# Patient Record
Sex: FEMALE | Race: WHITE | Employment: OTHER | URBAN - METROPOLITAN AREA
[De-identification: names, ages, dates, MRNs, and addresses within clinical notes are randomized per-mention and may not be internally consistent; named-entity substitution may affect disease eponyms.]

---

## 2017-01-19 PROCEDURE — 36415 COLL VENOUS BLD VENIPUNCTURE: CPT | Performed by: INTERNAL MEDICINE

## 2017-01-19 PROCEDURE — 84432 ASSAY OF THYROGLOBULIN: CPT | Performed by: INTERNAL MEDICINE

## 2017-02-07 ENCOUNTER — LAB ENCOUNTER (OUTPATIENT)
Dept: LAB | Age: 82
End: 2017-02-07
Attending: INTERNAL MEDICINE
Payer: MEDICARE

## 2017-02-07 DIAGNOSIS — K83.1: Primary | ICD-10-CM

## 2017-02-07 LAB
ALBUMIN SERPL-MCNC: 3.5 G/DL (ref 3.5–4.8)
ALP LIVER SERPL-CCNC: 227 U/L (ref 55–142)
ALT SERPL-CCNC: 48 U/L (ref 14–54)
AST SERPL-CCNC: 44 U/L (ref 15–41)
BILIRUB DIRECT SERPL-MCNC: 0.2 MG/DL (ref 0.1–0.5)
BILIRUB SERPL-MCNC: 0.6 MG/DL (ref 0.1–2)
M PROTEIN MFR SERPL ELPH: 6.8 G/DL (ref 6.1–8.3)

## 2017-02-07 PROCEDURE — 80076 HEPATIC FUNCTION PANEL: CPT

## 2017-02-22 PROBLEM — E03.9 HYPOTHYROIDISM, UNSPECIFIED TYPE: Status: ACTIVE | Noted: 2017-02-22

## 2017-04-14 PROBLEM — M81.0 AGE-RELATED OSTEOPOROSIS WITHOUT CURRENT PATHOLOGICAL FRACTURE: Status: ACTIVE | Noted: 2017-04-14

## 2017-06-01 ENCOUNTER — LAB ENCOUNTER (OUTPATIENT)
Dept: LAB | Age: 82
End: 2017-06-01
Attending: INTERNAL MEDICINE
Payer: MEDICARE

## 2017-06-01 DIAGNOSIS — R79.89 ELEVATED LFTS: Primary | ICD-10-CM

## 2017-06-01 PROCEDURE — 80076 HEPATIC FUNCTION PANEL: CPT

## 2017-07-03 ENCOUNTER — LAB ENCOUNTER (OUTPATIENT)
Dept: LAB | Age: 82
End: 2017-07-03
Attending: INTERNAL MEDICINE
Payer: MEDICARE

## 2017-07-03 DIAGNOSIS — R74.8 ELEVATED LIVER ENZYMES: Primary | ICD-10-CM

## 2017-07-03 PROCEDURE — 80076 HEPATIC FUNCTION PANEL: CPT

## 2017-07-04 LAB
ALBUMIN SERPL-MCNC: 3.8 G/DL (ref 3.5–4.8)
ALP LIVER SERPL-CCNC: 89 U/L (ref 55–142)
ALT SERPL-CCNC: 38 U/L (ref 14–54)
AST SERPL-CCNC: 28 U/L (ref 15–41)
BILIRUB DIRECT SERPL-MCNC: 0.1 MG/DL (ref 0.1–0.5)
BILIRUB SERPL-MCNC: 0.6 MG/DL (ref 0.1–2)
M PROTEIN MFR SERPL ELPH: 7 G/DL (ref 6.1–8.3)

## 2017-10-19 PROCEDURE — 82523 COLLAGEN CROSSLINKS: CPT | Performed by: INTERNAL MEDICINE

## 2017-10-25 PROCEDURE — 83883 ASSAY NEPHELOMETRY NOT SPEC: CPT | Performed by: OTHER

## 2017-10-25 PROCEDURE — 86334 IMMUNOFIX E-PHORESIS SERUM: CPT | Performed by: OTHER

## 2017-10-25 PROCEDURE — 82607 VITAMIN B-12: CPT | Performed by: OTHER

## 2017-10-25 PROCEDURE — 84165 PROTEIN E-PHORESIS SERUM: CPT | Performed by: OTHER

## 2017-11-01 PROCEDURE — 86480 TB TEST CELL IMMUN MEASURE: CPT | Performed by: INTERNAL MEDICINE

## 2017-11-01 PROCEDURE — 36415 COLL VENOUS BLD VENIPUNCTURE: CPT | Performed by: INTERNAL MEDICINE

## 2017-11-07 PROCEDURE — 86480 TB TEST CELL IMMUN MEASURE: CPT | Performed by: INTERNAL MEDICINE

## 2017-11-07 PROCEDURE — 36415 COLL VENOUS BLD VENIPUNCTURE: CPT | Performed by: INTERNAL MEDICINE

## 2018-02-02 PROBLEM — F33.41 RECURRENT MAJOR DEPRESSIVE DISORDER, IN PARTIAL REMISSION (HCC): Status: ACTIVE | Noted: 2018-02-02

## 2018-02-02 PROBLEM — I70.0 AORTIC ATHEROSCLEROSIS (HCC): Status: ACTIVE | Noted: 2018-02-02

## 2018-02-26 ENCOUNTER — APPOINTMENT (OUTPATIENT)
Dept: GENERAL RADIOLOGY | Facility: HOSPITAL | Age: 83
DRG: 871 | End: 2018-02-26
Attending: EMERGENCY MEDICINE
Payer: MEDICARE

## 2018-02-26 ENCOUNTER — HOSPITAL ENCOUNTER (INPATIENT)
Facility: HOSPITAL | Age: 83
LOS: 13 days | Discharge: NURSING, REHAB OR LONG-TERM CARE | DRG: 871 | End: 2018-03-12
Attending: EMERGENCY MEDICINE | Admitting: INTERNAL MEDICINE
Payer: MEDICARE

## 2018-02-26 DIAGNOSIS — A41.9 SEPSIS DUE TO URINARY TRACT INFECTION (HCC): Primary | ICD-10-CM

## 2018-02-26 DIAGNOSIS — N39.0 SEPSIS DUE TO URINARY TRACT INFECTION (HCC): Primary | ICD-10-CM

## 2018-02-26 PROBLEM — N28.9 ACUTE RENAL INSUFFICIENCY: Status: ACTIVE | Noted: 2018-02-26

## 2018-02-26 LAB
ANION GAP SERPL CALC-SCNC: 11 MMOL/L (ref 0–18)
BASOPHILS # BLD: 0.1 K/UL (ref 0–0.2)
BASOPHILS NFR BLD: 0 %
BILIRUB UR QL: NEGATIVE
BUN SERPL-MCNC: 33 MG/DL (ref 8–20)
BUN/CREAT SERPL: 15.6 (ref 10–20)
CALCIUM SERPL-MCNC: 8.9 MG/DL (ref 8.5–10.5)
CHLORIDE SERPL-SCNC: 98 MMOL/L (ref 95–110)
CO2 SERPL-SCNC: 24 MMOL/L (ref 22–32)
COLOR UR: YELLOW
CREAT SERPL-MCNC: 2.12 MG/DL (ref 0.5–1.5)
EOSINOPHIL # BLD: 0 K/UL (ref 0–0.7)
EOSINOPHIL NFR BLD: 0 %
ERYTHROCYTE [DISTWIDTH] IN BLOOD BY AUTOMATED COUNT: 15.1 % (ref 11–15)
GLUCOSE SERPL-MCNC: 135 MG/DL (ref 70–99)
GLUCOSE UR-MCNC: NEGATIVE MG/DL
HCT VFR BLD AUTO: 41 % (ref 35–48)
HGB BLD-MCNC: 13.7 G/DL (ref 12–16)
KETONES UR-MCNC: NEGATIVE MG/DL
LACTATE SERPL-SCNC: 1.1 MMOL/L (ref 0.5–2.2)
LYMPHOCYTES # BLD: 0.7 K/UL (ref 1–4)
LYMPHOCYTES NFR BLD: 3 %
MCH RBC QN AUTO: 35.1 PG (ref 27–32)
MCHC RBC AUTO-ENTMCNC: 33.5 G/DL (ref 32–37)
MCV RBC AUTO: 105 FL (ref 80–100)
MONOCYTES # BLD: 1.6 K/UL (ref 0–1)
MONOCYTES NFR BLD: 6 %
NEUTROPHILS # BLD AUTO: 22.2 K/UL (ref 1.8–7.7)
NEUTROPHILS NFR BLD: 91 %
NITRITE UR QL STRIP.AUTO: NEGATIVE
OSMOLALITY UR CALC.SUM OF ELEC: 285 MOSM/KG (ref 275–295)
PH UR: 5 [PH] (ref 5–8)
PLATELET # BLD AUTO: 203 K/UL (ref 140–400)
PMV BLD AUTO: 8 FL (ref 7.4–10.3)
POTASSIUM SERPL-SCNC: 4.5 MMOL/L (ref 3.3–5.1)
PROT UR-MCNC: 100 MG/DL
RBC # BLD AUTO: 3.9 M/UL (ref 3.7–5.4)
RBC #/AREA URNS AUTO: 45 /HPF
SODIUM SERPL-SCNC: 133 MMOL/L (ref 136–144)
SP GR UR STRIP: 1.02 (ref 1–1.03)
UROBILINOGEN UR STRIP-ACNC: <2
VIT C UR-MCNC: NEGATIVE MG/DL
WBC # BLD AUTO: 24.6 K/UL (ref 4–11)
WBC #/AREA URNS AUTO: 1786 /HPF

## 2018-02-26 PROCEDURE — 80048 BASIC METABOLIC PNL TOTAL CA: CPT | Performed by: EMERGENCY MEDICINE

## 2018-02-26 PROCEDURE — 87077 CULTURE AEROBIC IDENTIFY: CPT | Performed by: EMERGENCY MEDICINE

## 2018-02-26 PROCEDURE — 71045 X-RAY EXAM CHEST 1 VIEW: CPT | Performed by: EMERGENCY MEDICINE

## 2018-02-26 PROCEDURE — 87086 URINE CULTURE/COLONY COUNT: CPT | Performed by: EMERGENCY MEDICINE

## 2018-02-26 PROCEDURE — 87186 SC STD MICRODIL/AGAR DIL: CPT | Performed by: EMERGENCY MEDICINE

## 2018-02-26 PROCEDURE — 85025 COMPLETE CBC W/AUTO DIFF WBC: CPT | Performed by: EMERGENCY MEDICINE

## 2018-02-26 PROCEDURE — 96375 TX/PRO/DX INJ NEW DRUG ADDON: CPT

## 2018-02-26 PROCEDURE — 83605 ASSAY OF LACTIC ACID: CPT | Performed by: EMERGENCY MEDICINE

## 2018-02-26 PROCEDURE — 99285 EMERGENCY DEPT VISIT HI MDM: CPT

## 2018-02-26 PROCEDURE — 87040 BLOOD CULTURE FOR BACTERIA: CPT | Performed by: EMERGENCY MEDICINE

## 2018-02-26 PROCEDURE — 87088 URINE BACTERIA CULTURE: CPT | Performed by: EMERGENCY MEDICINE

## 2018-02-26 PROCEDURE — 96365 THER/PROPH/DIAG IV INF INIT: CPT

## 2018-02-26 PROCEDURE — 36415 COLL VENOUS BLD VENIPUNCTURE: CPT

## 2018-02-26 PROCEDURE — 80048 BASIC METABOLIC PNL TOTAL CA: CPT

## 2018-02-26 PROCEDURE — 85025 COMPLETE CBC W/AUTO DIFF WBC: CPT

## 2018-02-26 PROCEDURE — 81001 URINALYSIS AUTO W/SCOPE: CPT | Performed by: EMERGENCY MEDICINE

## 2018-02-26 RX ORDER — ONDANSETRON 2 MG/ML
4 INJECTION INTRAMUSCULAR; INTRAVENOUS ONCE
Status: COMPLETED | OUTPATIENT
Start: 2018-02-26 | End: 2018-02-26

## 2018-02-27 PROBLEM — Z85.3 HISTORY OF BREAST CANCER: Status: ACTIVE | Noted: 2018-02-27

## 2018-02-27 LAB
ANION GAP SERPL CALC-SCNC: 8 MMOL/L (ref 0–18)
BASOPHILS # BLD: 0 K/UL (ref 0–0.2)
BASOPHILS NFR BLD: 0 %
BUN SERPL-MCNC: 34 MG/DL (ref 8–20)
BUN/CREAT SERPL: 17.4 (ref 10–20)
CALCIUM SERPL-MCNC: 7.4 MG/DL (ref 8.5–10.5)
CHLORIDE SERPL-SCNC: 102 MMOL/L (ref 95–110)
CO2 SERPL-SCNC: 22 MMOL/L (ref 22–32)
CREAT SERPL-MCNC: 1.95 MG/DL (ref 0.5–1.5)
EOSINOPHIL # BLD: 0 K/UL (ref 0–0.7)
EOSINOPHIL NFR BLD: 0 %
ERYTHROCYTE [DISTWIDTH] IN BLOOD BY AUTOMATED COUNT: 15 % (ref 11–15)
GLUCOSE SERPL-MCNC: 154 MG/DL (ref 70–99)
HCT VFR BLD AUTO: 33.5 % (ref 35–48)
HGB BLD-MCNC: 11 G/DL (ref 12–16)
LYMPHOCYTES # BLD: 0.4 K/UL (ref 1–4)
LYMPHOCYTES NFR BLD: 2 %
MCH RBC QN AUTO: 34.7 PG (ref 27–32)
MCHC RBC AUTO-ENTMCNC: 32.9 G/DL (ref 32–37)
MCV RBC AUTO: 105.7 FL (ref 80–100)
MONOCYTES # BLD: 1.9 K/UL (ref 0–1)
MONOCYTES NFR BLD: 9 %
NEUTROPHILS # BLD AUTO: 19.1 K/UL (ref 1.8–7.7)
NEUTROPHILS NFR BLD: 83 %
NEUTS BAND NFR BLD: 6 %
OSMOLALITY UR CALC.SUM OF ELEC: 285 MOSM/KG (ref 275–295)
PLATELET # BLD AUTO: 175 K/UL (ref 140–400)
PMV BLD AUTO: 8.8 FL (ref 7.4–10.3)
POTASSIUM SERPL-SCNC: 4 MMOL/L (ref 3.3–5.1)
RBC # BLD AUTO: 3.17 M/UL (ref 3.7–5.4)
SODIUM SERPL-SCNC: 132 MMOL/L (ref 136–144)
WBC # BLD AUTO: 21.5 K/UL (ref 4–11)

## 2018-02-27 PROCEDURE — 85007 BL SMEAR W/DIFF WBC COUNT: CPT | Performed by: INTERNAL MEDICINE

## 2018-02-27 PROCEDURE — 97116 GAIT TRAINING THERAPY: CPT

## 2018-02-27 PROCEDURE — 97530 THERAPEUTIC ACTIVITIES: CPT

## 2018-02-27 PROCEDURE — 85025 COMPLETE CBC W/AUTO DIFF WBC: CPT | Performed by: INTERNAL MEDICINE

## 2018-02-27 PROCEDURE — 97162 PT EVAL MOD COMPLEX 30 MIN: CPT

## 2018-02-27 PROCEDURE — 97166 OT EVAL MOD COMPLEX 45 MIN: CPT

## 2018-02-27 PROCEDURE — 85027 COMPLETE CBC AUTOMATED: CPT | Performed by: INTERNAL MEDICINE

## 2018-02-27 PROCEDURE — 80048 BASIC METABOLIC PNL TOTAL CA: CPT | Performed by: INTERNAL MEDICINE

## 2018-02-27 RX ORDER — OXYBUTYNIN CHLORIDE 5 MG/1
5 TABLET, EXTENDED RELEASE ORAL DAILY
Status: DISCONTINUED | OUTPATIENT
Start: 2018-02-27 | End: 2018-03-02

## 2018-02-27 RX ORDER — ACETAMINOPHEN 325 MG/1
650 TABLET ORAL EVERY 6 HOURS PRN
Status: DISCONTINUED | OUTPATIENT
Start: 2018-02-27 | End: 2018-03-12

## 2018-02-27 RX ORDER — FOLIC ACID 1 MG/1
1 TABLET ORAL
Status: DISCONTINUED | OUTPATIENT
Start: 2018-02-27 | End: 2018-03-12

## 2018-02-27 RX ORDER — MULTIVITAMIN WITH IRON
50 TABLET ORAL DAILY
Status: DISCONTINUED | OUTPATIENT
Start: 2018-02-27 | End: 2018-03-12

## 2018-02-27 RX ORDER — BACLOFEN 10 MG/1
10 TABLET ORAL DAILY
Status: DISCONTINUED | OUTPATIENT
Start: 2018-02-27 | End: 2018-03-02

## 2018-02-27 RX ORDER — PANTOPRAZOLE SODIUM 40 MG/1
40 TABLET, DELAYED RELEASE ORAL
Status: DISCONTINUED | OUTPATIENT
Start: 2018-02-27 | End: 2018-03-12

## 2018-02-27 RX ORDER — LEVOTHYROXINE SODIUM 0.03 MG/1
25 TABLET ORAL
Status: DISCONTINUED | OUTPATIENT
Start: 2018-02-27 | End: 2018-03-12

## 2018-02-27 RX ORDER — CLONAZEPAM 0.5 MG/1
0.5 TABLET ORAL NIGHTLY
Status: DISCONTINUED | OUTPATIENT
Start: 2018-02-27 | End: 2018-03-02

## 2018-02-27 RX ORDER — SODIUM CHLORIDE 0.9 % (FLUSH) 0.9 %
3 SYRINGE (ML) INJECTION AS NEEDED
Status: DISCONTINUED | OUTPATIENT
Start: 2018-02-27 | End: 2018-03-12

## 2018-02-27 RX ORDER — ISONIAZID 300 MG/1
300 TABLET ORAL DAILY
Status: DISCONTINUED | OUTPATIENT
Start: 2018-02-27 | End: 2018-03-12

## 2018-02-27 RX ORDER — PREDNISONE 1 MG/1
5 TABLET ORAL DAILY
Status: DISCONTINUED | OUTPATIENT
Start: 2018-02-27 | End: 2018-03-12

## 2018-02-27 RX ORDER — AZITHROMYCIN 250 MG/1
250 TABLET, FILM COATED ORAL DAILY
Status: DISCONTINUED | OUTPATIENT
Start: 2018-02-27 | End: 2018-03-01

## 2018-02-27 RX ORDER — OYSTER SHELL CALCIUM WITH VITAMIN D 500; 200 MG/1; [IU]/1
2 TABLET, FILM COATED ORAL DAILY
Status: DISCONTINUED | OUTPATIENT
Start: 2018-02-27 | End: 2018-03-12

## 2018-02-27 RX ORDER — HYDROCODONE BITARTRATE AND ACETAMINOPHEN 10; 325 MG/1; MG/1
1 TABLET ORAL 2 TIMES DAILY
Status: DISCONTINUED | OUTPATIENT
Start: 2018-02-27 | End: 2018-03-01

## 2018-02-27 RX ORDER — ONDANSETRON 2 MG/ML
4 INJECTION INTRAMUSCULAR; INTRAVENOUS EVERY 6 HOURS PRN
Status: DISCONTINUED | OUTPATIENT
Start: 2018-02-27 | End: 2018-03-12

## 2018-02-27 RX ORDER — GABAPENTIN 300 MG/1
300 CAPSULE ORAL 2 TIMES DAILY
Status: DISCONTINUED | OUTPATIENT
Start: 2018-02-27 | End: 2018-03-12

## 2018-02-27 RX ORDER — DEXTROSE AND SODIUM CHLORIDE 5; .45 G/100ML; G/100ML
INJECTION, SOLUTION INTRAVENOUS CONTINUOUS
Status: DISCONTINUED | OUTPATIENT
Start: 2018-02-27 | End: 2018-03-04

## 2018-02-27 RX ORDER — POLYETHYLENE GLYCOL 3350 17 G/17G
17 POWDER, FOR SOLUTION ORAL DAILY
Status: DISCONTINUED | OUTPATIENT
Start: 2018-02-27 | End: 2018-03-12

## 2018-02-27 RX ORDER — HEPARIN SODIUM 5000 [USP'U]/ML
5000 INJECTION, SOLUTION INTRAVENOUS; SUBCUTANEOUS EVERY 12 HOURS SCHEDULED
Status: DISCONTINUED | OUTPATIENT
Start: 2018-02-27 | End: 2018-03-12

## 2018-02-27 RX ORDER — AMLODIPINE BESYLATE 5 MG/1
5 TABLET ORAL
Status: DISCONTINUED | OUTPATIENT
Start: 2018-02-27 | End: 2018-03-12

## 2018-02-27 RX ORDER — MIRTAZAPINE 15 MG/1
15 TABLET, FILM COATED ORAL NIGHTLY
Status: DISCONTINUED | OUTPATIENT
Start: 2018-02-27 | End: 2018-03-12

## 2018-02-27 RX ORDER — AZITHROMYCIN 250 MG/1
500 TABLET, FILM COATED ORAL DAILY
Status: COMPLETED | OUTPATIENT
Start: 2018-02-27 | End: 2018-02-27

## 2018-02-27 RX ORDER — ANTIARTHRITIC COMBINATION NO.2 900 MG
1 TABLET ORAL 3 TIMES DAILY
Status: DISCONTINUED | OUTPATIENT
Start: 2018-02-27 | End: 2018-02-27 | Stop reason: RX

## 2018-02-27 RX ORDER — LISINOPRIL 10 MG/1
10 TABLET ORAL
Status: DISCONTINUED | OUTPATIENT
Start: 2018-02-27 | End: 2018-02-28

## 2018-02-27 NOTE — PHYSICAL THERAPY NOTE
PHYSICAL THERAPY EVALUATION - INPATIENT     Room Number: 348/348-A  Evaluation Date: 2/27/2018  Type of Evaluation: Initial   Physician Order: PT Eval and Treat    Presenting Problem:  (Sepsis secondary to UTI and blood infection )  Reason for [facility]) CHOLECYSTECTOMY  11/29/2012: ANAHY GASCA & Luis Miguel Montez NDL/CATH SPI DX/THER TCU      Comment: Procedure: LUMBAR EPIDURAL;  Surgeon:                Cem Mcgraw MD;  Location: [place] [address] MANAGEMENT  1/8/2013: [address] NDL/CATH Talib  2/4/2016: INJECTION, W/WO CONTRAST, DX/THERAPEUTIC SUBST* N/A      Comment: Procedure: LUMBAR / TRANSFORAMINAL EPIDURAL                STEROID INJECTION;  Surgeon: Manjinder Bates DO;  Location: [address] [place] [address]  5/20/ Liliam Askew MD;  Location: [place] [address] MANAGEMENT  1/8/2013: PATIENT DOCUMENTED NOT TO HAVE EXPERIENCED ANY*      Comment: Procedure: CAUDAL;  Surgeon: Liliam Askew MD;  Location: [address] [place] [address] typical per patient. Requires assist for transfers and she is usually independent. Motor control is intact.  Tone symmetrical.   BALANCE  Static Sitting: Good  Dynamic Sitting: Fair +  Static Standing: Fair  Dynamic Standing: Fair -    ADDITIONAL TESTS safety/call for assist to get up. Patient End of Session: In bed;Needs met;Call light within reach;RN aware of session/findings; All patient questions and concerns addressed; Alarm set; Family present    ASSESSMENT   Patient is a 80year old female admitt ambulate 300' with or without assistive device with supervision on room air with stable vitals. Goal #4 Ascend and descend 12 steps with 1 rail with supervision.     Goal #5    Goal #6    Goal Comments: Goals established on 2/27/2018

## 2018-02-27 NOTE — OCCUPATIONAL THERAPY NOTE
OCCUPATIONAL THERAPY EVALUATION - INPATIENT     Room Number: 348/348-A  Evaluation Date: 2/27/2018  Type of Evaluation: Initial  Presenting Problem: sepsis d/t UTI    Physician Order: IP Consult to Occupational Therapy  Reason for Therapy: ADL/IADL Dysfunc conservation/work simplification techniques;UE strengthening/ROM; Functional transfer training; Endurance training;Patient/Family education       OCCUPATIONAL THERAPY MEDICAL/SOCIAL HISTORY     Problem List  Principal Problem:    Sepsis due to urinary tract PAIN MANAGEMENT  1/8/2013: Sachin Austin GIJORDON & Lidia Chirinos NDL/CATH SPI DX/THER HSR      Comment: Procedure: CAUDAL;  Surgeon: Gilles London MD;  Location: [address] [place] [address] MANAGEMENT  8/29/2012: INJ FOR SACROILIAC JT ANESTH      Comment: Proced ;  Location: [address] [place] [address]  5/20/2016: INJECTION, W/WO CONTRAST, DX/THERAPEUTIC SUBST* N/A      Comment: Procedure: LUMBAR / TRANSFORAMINAL EPIDURAL                STEROID INJECTION;  Surgeon: Jaime Boston DO Liliam Askew MD;  Location: [address] [place] [address] MANAGEMENT  10/17/2012: PATIENT [place] Ayla PREOPERATIVE ORDER FOR IV ANT*      Comment: Procedure: BURSA INJECTION;  Surgeon: Marta Galvin MD;  Location: [address] [place] [address] and Patient reports some blurriness at baseline.      PERCEPTION  Overall Perception Status:   WFL - within functional limits    SENSATION  Light touch:  intact    Communication: expressive and receptive language intact    Behavioral/Emotional/Social: coope questions and concerns addressed; Family present    OT Goals  Patients self stated goal is: return to PLOF     Patient will complete functional transfer with SBA. Comment:     Patient will complete toileting with SBA.   Comment:     Patient will tolerate st

## 2018-02-27 NOTE — ED INITIAL ASSESSMENT (HPI)
Per pt, she has been feeling confused for \"a few weeks\". Family with pt states she was given sublingual marijuana yesterday by a family member to make her feel better. Oral temp 101.5, oral mucosa dry.

## 2018-02-27 NOTE — H&P
[facility]    History and Physical    Kellyjacobyholly Asad Patient Status:  Inpatient    12/15/1928 MRN [id number]   Location [facility] 3W/SW Attending Greta Davenport MD   Hosp Day # 0 PCP Rao Goldstein MD     Date: Spondylolisthesis, grade 2 9/29/2015   • Trochanteric bursitis of right hip 4/19/2016     Past Surgical History:  No date: APPENDECTOMY  8-25-11: BACK SURGERY      Comment: L3-L5 decomp w/uninstrumented fusion  No date: CHOLECYSTECTOMY  11/29/2012: Arnoldo Escoto MANAGEMENT  10/13/2015: INJECTION, W/WO CONTRAST, DX/THERAPEUTIC SUBST* N/A      Comment: Procedure: CAUDAL EPIDURAL STEROID INJECTION;                Surgeon: Magali Beintes DO;  Location: [address] [place] [address]  2/4/2016: INJECTION, W Surgeon: Lindsey Jensen MD;  Location: [address] [place] [address]                MANAGEMENT  11/29/2012: PATIENT DOCUMENTED NOT TO HAVE EXPERIENCED ANY*      Comment: Procedure: LUMBAR EPIDURAL;  Surgeon:                Amy Arriola MD;  Ashley Collins mouth daily. Levothyroxine Sodium 25 MCG Oral Tab Take 1 tablet (25 mcg total) by mouth before breakfast.   HYDROcodone-acetaminophen  MG Oral Tab Take 1 tablet by mouth every 8 (eight) hours.      METHOTREXATE 2.5 MG Oral Tab TAKE 4 TABLETS BY MOUT (37.3 °C), temperature source Oral, resp. rate 18, height 5' (1.524 m), weight 101 lb 1.6 oz (45.9 kg), SpO2 96 %, not currently breastfeeding. Constitutional: No distress.    HENT:   Oropharynx is dry without any exudate seen   Eyes: EOM are normal. stenosis    Osteoporosis    HTN (hypertension)    Rheumatoid arthritis involving multiple sites with positive rheumatoid factor (HCC)    Hypothyroidism, unspecified type    Bronchiectasis (HCC)    S/P mastectomy, bilateral    Recurrent major depressive dis

## 2018-02-27 NOTE — ED PROVIDER NOTES
Patient Seen in: [facility] AND [facility] Emergency Department    History   Patient presents with:  Altered Mental Status (neurologic)    Stated Complaint: AMS/ for 2 hrs     HPI    80year old female who has multiple medical problems including history of anxie EPIDURAL;  Surgeon:                Serge Romero MD;  Location: [place] [address] MANAGEMENT  1/8/2013: Jarod Weiner NDL/CATH SPI DX/THER EIJ      Comment: Procedure: CAUDAL;  Surgeon: Serge Romero MD;  Location: LUMBAR / TRANSFORAMINAL EPIDURAL                STEROID INJECTION;  Surgeon: Laura Benitez DO;  Location: [address] [place] [address]  5/20/2016: INJECTION, W/WO CONTRAST, DX/THERAPEUTIC SUBST* N/A      Comment: Procedure: Bar Hammer / Ti Millan PATIENT DOCUMENTED NOT TO HAVE EXPERIENCED ANY*      Comment: Procedure: CAUDAL;  Surgeon: Ion Guerrero MD;  Location: [address] [place] [address] MANAGEMENT  10/17/2012: PATIENT [facility] PREOPERATIVE ORDER FOR IV ANT*      Comment: Procedure: BU without pain. Neck supple. No neck rigidity. Normal range of motion present. Cardiovascular: Normal rate, regular rhythm, normal heart sounds and intact distal pulses. No murmur heard.   Pulmonary/Chest: Effort normal and breath sounds normal. No respi Status                     ---------                               -----------         ------                     CBC W/ DIFFERENTIAL[[id number]]          Abnormal            Final result                 Please view results for these tests on the individu lactic acid normal. Pt started on rocephin and will be admitted.     D/w Dr Moira Bowie - will admit    Disposition and Plan     Clinical Impression:  Sepsis due to urinary tract infection [facility])  (primary encounter diagnosis)    Disposition:  Admit  2/26/2018 10:

## 2018-02-28 LAB
ANION GAP SERPL CALC-SCNC: 7 MMOL/L (ref 0–18)
BASOPHILS # BLD: 0.1 K/UL (ref 0–0.2)
BASOPHILS NFR BLD: 0 %
BUN SERPL-MCNC: 36 MG/DL (ref 8–20)
BUN/CREAT SERPL: 15.7 (ref 10–20)
CALCIUM SERPL-MCNC: 7.1 MG/DL (ref 8.5–10.5)
CHLORIDE SERPL-SCNC: 100 MMOL/L (ref 95–110)
CO2 SERPL-SCNC: 23 MMOL/L (ref 22–32)
CREAT SERPL-MCNC: 2.3 MG/DL (ref 0.5–1.5)
EOSINOPHIL # BLD: 0.1 K/UL (ref 0–0.7)
EOSINOPHIL NFR BLD: 0 %
ERYTHROCYTE [DISTWIDTH] IN BLOOD BY AUTOMATED COUNT: 15.3 % (ref 11–15)
GLUCOSE SERPL-MCNC: 124 MG/DL (ref 70–99)
HCT VFR BLD AUTO: 35.4 % (ref 35–48)
HGB BLD-MCNC: 11.9 G/DL (ref 12–16)
LYMPHOCYTES # BLD: 0.6 K/UL (ref 1–4)
LYMPHOCYTES NFR BLD: 3 %
MCH RBC QN AUTO: 35.5 PG (ref 27–32)
MCHC RBC AUTO-ENTMCNC: 33.5 G/DL (ref 32–37)
MCV RBC AUTO: 105.9 FL (ref 80–100)
MONOCYTES # BLD: 1.8 K/UL (ref 0–1)
MONOCYTES NFR BLD: 9 %
NEUTROPHILS # BLD AUTO: 17.2 K/UL (ref 1.8–7.7)
NEUTROPHILS NFR BLD: 87 %
OSMOLALITY UR CALC.SUM OF ELEC: 280 MOSM/KG (ref 275–295)
PLATELET # BLD AUTO: 174 K/UL (ref 140–400)
PMV BLD AUTO: 8.5 FL (ref 7.4–10.3)
POTASSIUM SERPL-SCNC: 3.8 MMOL/L (ref 3.3–5.1)
RBC # BLD AUTO: 3.34 M/UL (ref 3.7–5.4)
SODIUM SERPL-SCNC: 130 MMOL/L (ref 136–144)
TSH SERPL-ACNC: 1.69 UIU/ML (ref 0.45–5.33)
WBC # BLD AUTO: 19.7 K/UL (ref 4–11)

## 2018-02-28 PROCEDURE — 80048 BASIC METABOLIC PNL TOTAL CA: CPT | Performed by: INTERNAL MEDICINE

## 2018-02-28 PROCEDURE — 85025 COMPLETE CBC W/AUTO DIFF WBC: CPT | Performed by: INTERNAL MEDICINE

## 2018-02-28 PROCEDURE — 84443 ASSAY THYROID STIM HORMONE: CPT | Performed by: INTERNAL MEDICINE

## 2018-02-28 NOTE — DIETARY NOTE
NUTRITION - INITIAL ASSESSMENT    Pt is at moderate nutrition risk. Pt meets malnutrition criteria.       CRITERIA FOR MALNUTRITION DIAGNOSIS:  Criteria for non-severe malnutrition diagnosis: chronic illness related to body fat mild depletion and muscle m DISEASES 4/30/12    URGE INCONTINENCE/UTI/SELVIN   • Personal history of skin cancer 1/15/2013   • Rheumatoid arthritis involving multiple sites with positive rheumatoid factor ([facility] [address] 5/9/2016   • S/P laminectomy 5/17/2016   • S/P lumbar spinal fusion 9/1 overlying rib cage region and mild depletion muscle mass temple region and calf region per visual exam.  - Fluid Accumulation: none per RN documentation or per visual exam  - Skin Integrity: at risk and RN documentation reviewed.   - Bernabe score 13 high ri

## 2018-02-28 NOTE — CM/SW NOTE
2/28-MD Orders received in regards to discharge planning. This Writer met with the Patient's daughter Trina Watts ([phone])  at bedside. The Patient resides alone in [address] [place] [address] in a single family home with 12 stairs to the 2nd floor and 3 steps to enter.

## 2018-02-28 NOTE — PROGRESS NOTES
[place] [facility]    Progress Note    Jordy Gonzales Patient Status:  Inpatient    12/15/1928 MRN [id number]   Location [facility] 3W/SW Attending Fran Thomas MD   Hosp Day # 1 PCP Yvette Gil MD     Subjective: unspecified type    Bronchiectasis (HCC)    S/P mastectomy, bilateral    Recurrent major depressive disorder, in partial remission (HCC)    Acute renal insufficiency    History of breast cancer    Overall fever curve is improving  White cell count is decre

## 2018-03-01 ENCOUNTER — APPOINTMENT (OUTPATIENT)
Dept: ULTRASOUND IMAGING | Facility: HOSPITAL | Age: 83
DRG: 871 | End: 2018-03-01
Attending: INTERNAL MEDICINE
Payer: MEDICARE

## 2018-03-01 LAB
ANION GAP SERPL CALC-SCNC: 7 MMOL/L (ref 0–18)
BASOPHILS # BLD: 0.2 K/UL (ref 0–0.2)
BASOPHILS NFR BLD: 1 %
BILIRUB UR QL: NEGATIVE
BUN SERPL-MCNC: 39 MG/DL (ref 8–20)
BUN/CREAT SERPL: 16.3 (ref 10–20)
CALCIUM SERPL-MCNC: 7 MG/DL (ref 8.5–10.5)
CHLORIDE SERPL-SCNC: 99 MMOL/L (ref 95–110)
CO2 SERPL-SCNC: 22 MMOL/L (ref 22–32)
COLOR UR: YELLOW
CREAT SERPL-MCNC: 2.4 MG/DL (ref 0.5–1.5)
EOSINOPHIL # BLD: 0.1 K/UL (ref 0–0.7)
EOSINOPHIL NFR BLD: 1 %
ERYTHROCYTE [DISTWIDTH] IN BLOOD BY AUTOMATED COUNT: 15.5 % (ref 11–15)
GLUCOSE SERPL-MCNC: 119 MG/DL (ref 70–99)
GLUCOSE UR-MCNC: NEGATIVE MG/DL
HCT VFR BLD AUTO: 35.7 % (ref 35–48)
HGB BLD-MCNC: 11.8 G/DL (ref 12–16)
KETONES UR-MCNC: NEGATIVE MG/DL
LYMPHOCYTES # BLD: 0.6 K/UL (ref 1–4)
LYMPHOCYTES NFR BLD: 3 %
MCH RBC QN AUTO: 35.1 PG (ref 27–32)
MCHC RBC AUTO-ENTMCNC: 33 G/DL (ref 32–37)
MCV RBC AUTO: 106.3 FL (ref 80–100)
MONOCYTES # BLD: 1.6 K/UL (ref 0–1)
MONOCYTES NFR BLD: 9 %
NEUTROPHILS # BLD AUTO: 15.5 K/UL (ref 1.8–7.7)
NEUTROPHILS NFR BLD: 86 %
NITRITE UR QL STRIP.AUTO: NEGATIVE
OSMOLALITY UR CALC.SUM OF ELEC: 277 MOSM/KG (ref 275–295)
PH UR: 5 [PH] (ref 5–8)
PLATELET # BLD AUTO: 206 K/UL (ref 140–400)
PMV BLD AUTO: 8.7 FL (ref 7.4–10.3)
POTASSIUM SERPL-SCNC: 3.9 MMOL/L (ref 3.3–5.1)
PROT UR-MCNC: 30 MG/DL
RBC # BLD AUTO: 3.36 M/UL (ref 3.7–5.4)
RBC #/AREA URNS AUTO: 4 /HPF
SODIUM SERPL-SCNC: 128 MMOL/L (ref 136–144)
SP GR UR STRIP: 1.01 (ref 1–1.03)
UROBILINOGEN UR STRIP-ACNC: <2
VIT C UR-MCNC: NEGATIVE MG/DL
WBC # BLD AUTO: 18 K/UL (ref 4–11)
WBC #/AREA URNS AUTO: 64 /HPF

## 2018-03-01 PROCEDURE — 80048 BASIC METABOLIC PNL TOTAL CA: CPT | Performed by: INTERNAL MEDICINE

## 2018-03-01 PROCEDURE — 87040 BLOOD CULTURE FOR BACTERIA: CPT | Performed by: INTERNAL MEDICINE

## 2018-03-01 PROCEDURE — 81001 URINALYSIS AUTO W/SCOPE: CPT | Performed by: INTERNAL MEDICINE

## 2018-03-01 PROCEDURE — 76770 US EXAM ABDO BACK WALL COMP: CPT | Performed by: INTERNAL MEDICINE

## 2018-03-01 PROCEDURE — 87086 URINE CULTURE/COLONY COUNT: CPT | Performed by: INTERNAL MEDICINE

## 2018-03-01 PROCEDURE — 97110 THERAPEUTIC EXERCISES: CPT

## 2018-03-01 PROCEDURE — 85025 COMPLETE CBC W/AUTO DIFF WBC: CPT | Performed by: INTERNAL MEDICINE

## 2018-03-01 PROCEDURE — A4216 STERILE WATER/SALINE, 10 ML: HCPCS | Performed by: INTERNAL MEDICINE

## 2018-03-01 RX ORDER — SODIUM CHLORIDE 9 MG/ML
INJECTION, SOLUTION INTRAVENOUS
Status: COMPLETED
Start: 2018-03-01 | End: 2018-03-01

## 2018-03-01 NOTE — PAYOR COMM NOTE
--------------  ADMISSION REVIEW     Payor: [facility] Abhi Treviño [place] #:  [id number]  Authorization Number: [id number]    Admit date: 2/27/18  Admit time: [address]       Admitting Physician: Nu Alvarez MD  Attending Physician:  Sai Mcguire Bed request comments Based on my assessment of the patient's current severity of illness, it is unlikely that the patient will be ready for hospital discharge until at least the second midnight passes.  Thus, inpatient hospitalization is necessary at this • Actinic keratosis 1/15/2013   • Anxiety state, unspecified    • Bronchiectasis ([facility] 6/18/2013   • Depression    • HTN (hypertension)    • HX: breast cancer    • Hypothyroidism, unspecified type 2/22/2017   • Ischemic colitis ([facility] 2/6/2014   • Lumbar ra 10/17/2012: INJ FOR SACROILIAC JT ANESTH      Comment: Procedure: BURSA INJECTION;  Surgeon: Anai Buck MD;  Location: [address] [place] [address]                MANAGEMENT  6/28/2016: INJECTION, ANESTHETIC/STEROID, TRANSFORAMINAL * Left      C Comment: Procedure: BURSA INJECTION;  Surgeon: Otilio Fitzpatrick MD;  Location: [address] [place] [address]                MANAGEMENT  11/29/2012: JOANNE-BART [facility] [address] [facility]  [facility] 5+ YR      Comment: Procedure: LUMBAR EPIDURAL;  Surgeon: Smoking status: Never Smoker                                                              Smokeless tobacco: Never Used                      Alcohol use: No[VR.2]                Review of Systems    Positive for stated complaint: AMS/ for 2 hrs   Other sys Psychiatric: She has a normal mood and affect. Her behavior is normal.   Nursing note and vitals reviewed.         ED Course[VR.1]     Labs Reviewed   BASIC METABOLIC PANEL (8) - Abnormal; Notable for the following:        Result Value    Glucose 135 (*) Pulse Ox: 93%, Abnormal and Improved after treatment, RA    Cardiac Monitor:[VR.1] Pulse Readings from Last 1 Encounters:  02/27/18 : 97[VR.2]  , sinus, normal for rate and rhythm    Radiology findings:[VR.1] Xr Chest Ap Portable  (cpt=71045)    Result Stanislaw Present on Admission  Date Reviewed: 2/20/2018          ICD-10-CM Noted POA    * (Principal)Sepsis due to urinary tract infection ([facility] [address] A41.9, N39.0 2/26/2018 Unknown    Acute renal insufficiency N28.9 2/26/2018 Unknown    Bron[facility]ctasis ([facility] J47.9 6/18/2 Patient is an 70-year-old white female with history of rheumatoid arthritis, chronic pain syndrome with multiple spinal surgeries, hypertension, bronchiectasis, history of breast cancer status post mastectomy, who was brought to emergency room by family wi Comment: Procedure: LUMBAR EPIDURAL;  Surgeon:                Reynold Young MD;  Location: [place] [address] MANAGEMENT  1/8/2013: Hayde GASCA & Nadia Oconnor NDL/CATH SPI DX/THER VRT      Comment: Procedure: CAUDAL;  Surgeon: Reynold Young, 2/4/2016: INJECTION, W/WO CONTRAST, DX/THERAPEUTIC SUBST* N/A      Comment: Procedure: LUMBAR / TRANSFORAMINAL EPIDURAL                STEROID INJECTION;  Surgeon: Angel Kinney DO;  Location: [address] [place] [address]  5/20/2016Dunia Cortez Edwin Scherer MD;  Location: [place] [address] MANAGEMENT  1/8/2013: PATIENT DOCUMENTED NOT TO HAVE EXPERIENCED ANY*      Comment: Procedure: CAUDAL;  Surgeon: Edwin Scherer MD;  Location: [address] [place] [address] HYDROcodone-acetaminophen  MG Oral Tab Take 1 tablet by mouth every 8 (eight) hours. METHOTREXATE 2.5 MG Oral Tab TAKE 4 TABLETS BY MOUTH  ONCE EVERY WEEK   baclofen 10 MG Oral Tab Take 10 mg by mouth daily.    AMLODIPINE BESYLATE 5 MG Oral Tab TA Blood pressure 117/78, pulse 97, temperature 99.1 °F (37.3 °C), temperature source Oral, resp. rate 18, height 5' (1.524 m), weight 101 lb 1.6 oz (45.9 kg), SpO2 96 %, not currently breastfeeding. [MH.2]     Constitutional: No distress.    HENT:   Oropharynx Sepsis due to urinary tract infection (HCC)  Active Problems:    Lumbar spinal stenosis    Osteoporosis    HTN (hypertension)    Rheumatoid arthritis involving multiple sites with positive rheumatoid factor (HCC)    Hypothyroidism, unspecified type    Br acetaminophen (TYLENOL) tab 650 mg     Date Action Dose Route User    3/1/2018 0851 Given 650 mg Oral Aayush Roberto RN      azithromycin [facility]) tab 250 mg     Date Action Dose Route User    2/28/2018 2020 Given 250 mg Oral Jacquiline SUPRIYA Holder Levothyroxine Sodium (SYNTHROID, LEVOTHROID) tab 25 mcg     Date Action Dose Route User    3/1/2018 0536 Given 25 mcg Oral Tala Mar, RN      mirtazapine (REMERON) tab 15 mg     Date Action Dose Route User    2/28/2018 2020 Given 15 mg Oral Pellin Subjective:      Constitutional:        Feels slightly better but still weak   Respiratory: Negative for chest tightness and shortness of breath. Cardiovascular: Negative for chest pain and palpitations. Gastrointestinal: Negative for abdominal pain. White cell count is decreasing slowly  Urine and blood cultures growing E. Coli--final sensitivities pending. Continue with Rocephin for management of urosepsis.   Continue azithromycin for management of possible bronchiectasis flare.     Renal function ha Cardiovascular: Normal rate, regular rhythm and normal heart sounds. Pulmonary/Chest: Effort normal and breath sounds normal. No respiratory distress. She has no wheezes. She has no rales. Abdominal: Soft. There is no tenderness.    Neurological: She i

## 2018-03-01 NOTE — DIETARY NOTE
Nutrition-Update Note    Patient visited at breakfast.  RN setting up tray. Patient may need further assistance with eating(possible feed-prn). Asked patient if she drank Ensure Enlive. Showed picture of bottle.   She answered yes, then asked again and sh

## 2018-03-01 NOTE — OCCUPATIONAL THERAPY NOTE
OCCUPATIONAL THERAPY TREATMENT NOTE - INPATIENT        Room Number: 348/348-A           Presenting Problem: Sepsis D/T UTI    Problem List  Principal Problem:    Sepsis due to urinary tract infection ([place] [address]  Active Problems:    Lumbar spinal stenosis    Ceci Franklin does the patient currently need…  -   Putting on and taking off regular lower body clothing?: A Little  -   Bathing (including washing, rinsing, drying)?: A Little  -   Toileting, which includes using toilet, bedpan or urinal? : A Little  -   Putting on an

## 2018-03-01 NOTE — PROGRESS NOTES
[facility] Pharmacy Note:  Renal Adjustment for piperacillin/tazobactam (Baudilio Ness)    Myah Sheets is a 80year old female who has been prescribed piperacillin/tazobactam (ZOSYN) 3.375 g every 8 hrs.   CrCl is estimated creatinine clearance is 11.4 mL/min (based

## 2018-03-01 NOTE — PROGRESS NOTES
[facility]    Progress Note    Marisela Person Patient Status:  Inpatient    12/15/1928 MRN [id number]   Location [facility] 3W/SW Attending Corey Shields MD   Hosp Day # 2 PCP Jackie Jett MD     Subjective: decreasing as quickly as expected. We will recheck blood cultures, urine cultures. We will check renal ultrasound. We will discontinue current ceftriaxone and broaden antibiotics with Zosyn.   Patient remains weak and will require extensive physical ther

## 2018-03-02 LAB
ALBUMIN SERPL BCP-MCNC: 2.4 G/DL (ref 3.5–4.8)
ALBUMIN/GLOB SERPL: 0.8 {RATIO} (ref 1–2)
ALP SERPL-CCNC: 112 U/L (ref 32–100)
ALT SERPL-CCNC: 19 U/L (ref 14–54)
ANION GAP SERPL CALC-SCNC: 6 MMOL/L (ref 0–18)
AST SERPL-CCNC: 33 U/L (ref 15–41)
BASOPHILS # BLD: 0 K/UL (ref 0–0.2)
BASOPHILS NFR BLD: 0 %
BILIRUB SERPL-MCNC: 0.6 MG/DL (ref 0.3–1.2)
BUN SERPL-MCNC: 39 MG/DL (ref 8–20)
BUN/CREAT SERPL: 19.4 (ref 10–20)
CALCIUM SERPL-MCNC: 7.2 MG/DL (ref 8.5–10.5)
CHLORIDE SERPL-SCNC: 101 MMOL/L (ref 95–110)
CO2 SERPL-SCNC: 23 MMOL/L (ref 22–32)
CREAT SERPL-MCNC: 2.01 MG/DL (ref 0.5–1.5)
EOSINOPHIL # BLD: 0.2 K/UL (ref 0–0.7)
EOSINOPHIL NFR BLD: 1 %
ERYTHROCYTE [DISTWIDTH] IN BLOOD BY AUTOMATED COUNT: 15.2 % (ref 11–15)
GLOBULIN PLAS-MCNC: 3 G/DL (ref 2.5–3.7)
GLUCOSE SERPL-MCNC: 115 MG/DL (ref 70–99)
HCT VFR BLD AUTO: 36 % (ref 35–48)
HGB BLD-MCNC: 11.8 G/DL (ref 12–16)
LYMPHOCYTES # BLD: 0.8 K/UL (ref 1–4)
LYMPHOCYTES NFR BLD: 5 %
MCH RBC QN AUTO: 34.5 PG (ref 27–32)
MCHC RBC AUTO-ENTMCNC: 32.7 G/DL (ref 32–37)
MCV RBC AUTO: 105.4 FL (ref 80–100)
MONOCYTES # BLD: 1.6 K/UL (ref 0–1)
MONOCYTES NFR BLD: 10 %
NEUTROPHILS # BLD AUTO: 13.8 K/UL (ref 1.8–7.7)
NEUTROPHILS NFR BLD: 78 %
NEUTS BAND NFR BLD: 6 %
OSMOLALITY UR CALC.SUM OF ELEC: 280 MOSM/KG (ref 275–295)
PLATELET # BLD AUTO: 225 K/UL (ref 140–400)
PMV BLD AUTO: 8.7 FL (ref 7.4–10.3)
POTASSIUM SERPL-SCNC: 4 MMOL/L (ref 3.3–5.1)
PROT SERPL-MCNC: 5.4 G/DL (ref 5.9–8.4)
RBC # BLD AUTO: 3.42 M/UL (ref 3.7–5.4)
SODIUM SERPL-SCNC: 130 MMOL/L (ref 136–144)
WBC # BLD AUTO: 16.4 K/UL (ref 4–11)

## 2018-03-02 PROCEDURE — 97530 THERAPEUTIC ACTIVITIES: CPT

## 2018-03-02 PROCEDURE — 97116 GAIT TRAINING THERAPY: CPT

## 2018-03-02 PROCEDURE — 85027 COMPLETE CBC AUTOMATED: CPT | Performed by: INTERNAL MEDICINE

## 2018-03-02 PROCEDURE — 97535 SELF CARE MNGMENT TRAINING: CPT

## 2018-03-02 PROCEDURE — 80053 COMPREHEN METABOLIC PANEL: CPT | Performed by: INTERNAL MEDICINE

## 2018-03-02 PROCEDURE — 85007 BL SMEAR W/DIFF WBC COUNT: CPT | Performed by: INTERNAL MEDICINE

## 2018-03-02 PROCEDURE — 85025 COMPLETE CBC W/AUTO DIFF WBC: CPT | Performed by: INTERNAL MEDICINE

## 2018-03-02 RX ORDER — ALPRAZOLAM 0.25 MG/1
0.25 TABLET ORAL 2 TIMES DAILY PRN
Status: DISCONTINUED | OUTPATIENT
Start: 2018-03-02 | End: 2018-03-07

## 2018-03-02 NOTE — PLAN OF CARE
GENITOURINARY - ADULT    • Absence of urinary retention Not Progressing          CARDIOVASCULAR - ADULT    • Maintains optimal cardiac output and hemodynamic stability Progressing    • Absence of cardiac arrhythmias or at baseline Progressing        NEUROL

## 2018-03-02 NOTE — OCCUPATIONAL THERAPY NOTE
OCCUPATIONAL THERAPY TREATMENT NOTE - INPATIENT        Room Number: 348/348-A           Presenting Problem: Sepsis D/T UTI    Problem List  Principal Problem:    Sepsis due to urinary tract infection ([facility]  Active Problems:    Lumbar spinal stenosis    January Askew TOLERANCE  Liters of O2:  2    ACTIVITIES OF DAILY LIVING ASSESSMENT  AM-PAC ‘6-Clicks’ Inpatient Daily Activity Short Form  How much help from another person does the patient currently need…  -   Putting on and taking off regular lower body clothing?: A L

## 2018-03-02 NOTE — PHYSICAL THERAPY NOTE
PHYSICAL THERAPY TREATMENT NOTE - INPATIENT    Room Number: 348/348-A     Session: 2       Presenting Problem:  (Sepsis secondary to UTI and blood infection )    Problem List  Principal Problem:    Sepsis due to urinary tract infection ([facility]  Active Probl MANAGEMENT  1/8/2013: FLUOR GID & Vivian Archer NDL/CATH SPI DX/THER JEU      Comment: Procedure: CAUDAL;  Surgeon: Hemanth Nava MD;  Location: [facility] FOR PAIN MANAGEMENT  8/29/2012: INJ FOR SACROILIAC JT ANESTH      Comment: Procedure: SI J ;  Location: [address] [place] [address]  5/20/2016: INJECTION, W/WO CONTRAST, DX/THERAPEUTIC SUBST* N/A      Comment: Procedure: LUMBAR / TRANSFORAMINAL EPIDURAL                STEROID INJECTION;  Surgeon: Abram Che DO;  Location MD;  Location: [address] [place] [address] Michela Farley MANAGEMENT  10/17/2012: PATIENT [place] Ayla PREOPERATIVE ORDER FOR IV ANT*      Comment: Procedure: BURSA INJECTION;  Surgeon: Kaykay Beltre MD;  Location: [address] [place] [address] Score:  Raw Score: 17   PT Approx Degree of Impairment Score: 50.57%   Standardized Score (AM-PAC Scale): 42.13   CMS Modifier (G-Code): CK    FUNCTIONAL ABILITY STATUS  Gait Assessment   Gait Assistance:  (CGA for straight lines, MIN A around bed/bathroom [facility] with many stairs. She was active and IND prior to admission and is well below her previous level of alertness and motor skills. She would benefit from walking to/from  with staff guidance while in acute setting.      DISCHARGE RECOMMENDATIONS  PT

## 2018-03-02 NOTE — PROGRESS NOTES
[facility]    Progress Note    Kellyvarun Kamara Patient Status:  Inpatient    12/15/1928 MRN [id number]   Location [facility] 3W/SW Attending Greta Davenport MD   Hosp Day # 3 PCP Rao Goldstein MD     Subjective: cell count is improved but patient continues to have intermittent fevers. Renal ultrasound done yesterday revealed 2.4 x 2 cm complicated cyst left kidney. ? Infected cyst.  Bladder also distended on ultrasound.    has been consulted and will see patie

## 2018-03-03 ENCOUNTER — APPOINTMENT (OUTPATIENT)
Dept: CT IMAGING | Facility: HOSPITAL | Age: 83
DRG: 871 | End: 2018-03-03
Attending: INTERNAL MEDICINE
Payer: MEDICARE

## 2018-03-03 LAB
ANION GAP SERPL CALC-SCNC: 8 MMOL/L (ref 0–18)
BASOPHILS # BLD: 0 K/UL (ref 0–0.2)
BASOPHILS NFR BLD: 0 %
BILIRUB UR QL: NEGATIVE
BUN SERPL-MCNC: 28 MG/DL (ref 8–20)
BUN/CREAT SERPL: 17 (ref 10–20)
CALCIUM SERPL-MCNC: 7.7 MG/DL (ref 8.5–10.5)
CHLORIDE SERPL-SCNC: 108 MMOL/L (ref 95–110)
CO2 SERPL-SCNC: 22 MMOL/L (ref 22–32)
COLOR UR: YELLOW
CREAT SERPL-MCNC: 1.65 MG/DL (ref 0.5–1.5)
EOSINOPHIL # BLD: 0.2 K/UL (ref 0–0.7)
EOSINOPHIL NFR BLD: 1 %
ERYTHROCYTE [DISTWIDTH] IN BLOOD BY AUTOMATED COUNT: 15 % (ref 11–15)
GLUCOSE SERPL-MCNC: 118 MG/DL (ref 70–99)
GLUCOSE UR-MCNC: NEGATIVE MG/DL
HCT VFR BLD AUTO: 33.6 % (ref 35–48)
HGB BLD-MCNC: 11.1 G/DL (ref 12–16)
HYALINE CASTS #/AREA URNS AUTO: 1 /LPF
KETONES UR-MCNC: NEGATIVE MG/DL
LYMPHOCYTES # BLD: 0.6 K/UL (ref 1–4)
LYMPHOCYTES NFR BLD: 4 %
MCH RBC QN AUTO: 34.5 PG (ref 27–32)
MCHC RBC AUTO-ENTMCNC: 32.9 G/DL (ref 32–37)
MCV RBC AUTO: 104.7 FL (ref 80–100)
MONOCYTES # BLD: 1.4 K/UL (ref 0–1)
MONOCYTES NFR BLD: 10 %
NEUTROPHILS # BLD AUTO: 11.8 K/UL (ref 1.8–7.7)
NEUTROPHILS NFR BLD: 84 %
NITRITE UR QL STRIP.AUTO: NEGATIVE
OSMOLALITY UR CALC.SUM OF ELEC: 293 MOSM/KG (ref 275–295)
PH UR: 5 [PH] (ref 5–8)
PLATELET # BLD AUTO: 265 K/UL (ref 140–400)
PMV BLD AUTO: 8.3 FL (ref 7.4–10.3)
POTASSIUM SERPL-SCNC: 4.4 MMOL/L (ref 3.3–5.1)
PROCALCITONIN SERPL-MCNC: 5.03 NG/ML (ref ?–0.11)
PROT UR-MCNC: NEGATIVE MG/DL
RBC # BLD AUTO: 3.21 M/UL (ref 3.7–5.4)
RBC #/AREA URNS AUTO: 16 /HPF
SODIUM SERPL-SCNC: 138 MMOL/L (ref 136–144)
SP GR UR STRIP: 1.01 (ref 1–1.03)
UROBILINOGEN UR STRIP-ACNC: <2
VIT C UR-MCNC: NEGATIVE MG/DL
WBC # BLD AUTO: 14 K/UL (ref 4–11)
WBC #/AREA URNS AUTO: 251 /HPF

## 2018-03-03 PROCEDURE — 87040 BLOOD CULTURE FOR BACTERIA: CPT | Performed by: INTERNAL MEDICINE

## 2018-03-03 PROCEDURE — 74176 CT ABD & PELVIS W/O CONTRAST: CPT | Performed by: INTERNAL MEDICINE

## 2018-03-03 PROCEDURE — 97116 GAIT TRAINING THERAPY: CPT

## 2018-03-03 PROCEDURE — 84145 PROCALCITONIN (PCT): CPT | Performed by: INTERNAL MEDICINE

## 2018-03-03 PROCEDURE — 87086 URINE CULTURE/COLONY COUNT: CPT | Performed by: INTERNAL MEDICINE

## 2018-03-03 PROCEDURE — 81001 URINALYSIS AUTO W/SCOPE: CPT | Performed by: INTERNAL MEDICINE

## 2018-03-03 PROCEDURE — 80048 BASIC METABOLIC PNL TOTAL CA: CPT | Performed by: INTERNAL MEDICINE

## 2018-03-03 PROCEDURE — 85025 COMPLETE CBC W/AUTO DIFF WBC: CPT | Performed by: INTERNAL MEDICINE

## 2018-03-03 PROCEDURE — 97530 THERAPEUTIC ACTIVITIES: CPT

## 2018-03-03 NOTE — PLAN OF CARE
GENITOURINARY - ADULT    • Absence of urinary retention Progressing        NEUROLOGICAL - ADULT    • Achieves stable or improved neurological status Progressing        Patient Centered Care    • Patient preferences are identified and integrated in the cliff

## 2018-03-03 NOTE — PROGRESS NOTES
[facility]    Progress Note    Almarie Pod Patient Status:  Inpatient    12/15/1928 MRN [id number]   Location [facility] 4W/SW/SE Attending Zina Woo MD   Hosp Day # 4 PCP MD Venkat Waters

## 2018-03-03 NOTE — CONSULTS
[facility] AND [facility] Infectious Disease Consult    Natali Renetta Patient Status:  Inpatient    12/15/1928 MRN [id number]   Location [facility] 4W/SW/SE Attending Akila Feliciano MD   Hosp Day # 4 PCP Tahir Anderson MD     [facility] APPENDECTOMY  8-25-11: BACK SURGERY      Comment: L3-L5 decomp w/uninstrumented fusion  No date: CHOLECYSTECTOMY  11/29/2012: ANAHY GASCA & Oni Dangelo NDL/CATH SPI DX/THER JSR      Comment: Procedure: LUMBAR EPIDURAL;  Surgeon:                Adelita Chambers MD; STEROID INJECTION;                Surgeon: Abram Che DO;  Location: [address] [place] [address]  2/4/2016: INJECTION, W/WO CONTRAST, DX/THERAPEUTIC SUBST* N/A      Comment: Procedure: LUMBAR / TRANSFORAMINAL EPIDURAL                STER PATIENT DOCUMENTED NOT TO HAVE EXPERIENCED ANY*      Comment: Procedure: LUMBAR EPIDURAL;  Surgeon:                Ion Guerrero MD;  Location: [place] [address] MANAGEMENT  1/8/2013: PATIENT DOCUMENTED NOT TO HAVE EXPERIENCED ANY* g, Oral, Daily  •  predniSONE (DELTASONE) tab 5 mg, 5 mg, Oral, Daily  •  vitamin B-6 (pyridoxine) tab 50 mg, 50 mg, Oral, Daily  •  Calcium Carbonate-Vitamin D (OSCAL-500) 500-200 MG-UNIT per tab 2 tablet, 2 tablet, Oral, Daily  •  Pantoprazole Sodium (AK negative.     Vital signs in last 24 hours:  Patient Vitals for the past 24 hrs:   BP Temp Temp src Pulse Resp SpO2   03/03/18 0555 148/62 98.2 °F (36.8 °C) Oral 86 20 96 %   03/02/18 2126 145/63 97.8 °F (36.6 °C) Oral 77 20 96 %   03/02/18 1700 120/60 98.8 ([facility]     Recurrent major depressive disorder, in partial remission ([facility]     Sepsis due to urinary tract infection ([facility]     Acute renal insufficiency     History of breast cancer    1.   Gram Negative Sepsis; with fever, leukocytosis, AMS and Lethargy, Felicity Prince. If you have any questions or concerns please call [facility] at [phone].      Suri Dominguez MD  3/3/2018  11:13 AM

## 2018-03-03 NOTE — PHYSICAL THERAPY NOTE
PHYSICAL THERAPY TREATMENT NOTE - INPATIENT     Room Number: 444/444-A       Presenting Problem:  (Sepsis secondary to UTI and blood infection )    Problem List  Principal Problem:    Sepsis due to urinary tract infection ([facility]  Active Problems:    Lumbar removed)    WEIGHT BEARING RESTRICTION  Weight Bearing Restriction: None                PAIN ASSESSMENT   Ratin  Location: abdomen distented, no c/o pain  Management Techniques:  Activity promotion    BALANCE level:IND  PROGRESS: MIN A   Goal #2 Patient is able to demonstrate transfers IND from bed, chair and toilet. PROGRESS: MIN A from bed and toilet today.     Goal #3 Patient is able to ambulate 300' with or without assistive device with supervision on room

## 2018-03-04 ENCOUNTER — APPOINTMENT (OUTPATIENT)
Dept: CT IMAGING | Facility: HOSPITAL | Age: 83
DRG: 871 | End: 2018-03-04
Attending: INTERNAL MEDICINE
Payer: MEDICARE

## 2018-03-04 LAB
ALBUMIN SERPL BCP-MCNC: 2.1 G/DL (ref 3.5–4.8)
ALBUMIN/GLOB SERPL: 0.7 {RATIO} (ref 1–2)
ALP SERPL-CCNC: 127 U/L (ref 32–100)
ALT SERPL-CCNC: 14 U/L (ref 14–54)
AMMONIA PLAS-MCNC: 49 UG/DL (ref 19.5–64.6)
ANION GAP SERPL CALC-SCNC: 4 MMOL/L (ref 0–18)
AST SERPL-CCNC: 24 U/L (ref 15–41)
BASOPHILS # BLD: 0.1 K/UL (ref 0–0.2)
BASOPHILS NFR BLD: 0 %
BILIRUB SERPL-MCNC: 0.6 MG/DL (ref 0.3–1.2)
BUN SERPL-MCNC: 22 MG/DL (ref 8–20)
BUN/CREAT SERPL: 14.2 (ref 10–20)
CALCIUM SERPL-MCNC: 7.7 MG/DL (ref 8.5–10.5)
CHLORIDE SERPL-SCNC: 111 MMOL/L (ref 95–110)
CO2 SERPL-SCNC: 23 MMOL/L (ref 22–32)
CREAT SERPL-MCNC: 1.55 MG/DL (ref 0.5–1.5)
EOSINOPHIL # BLD: 0.2 K/UL (ref 0–0.7)
EOSINOPHIL NFR BLD: 2 %
ERYTHROCYTE [DISTWIDTH] IN BLOOD BY AUTOMATED COUNT: 15.5 % (ref 11–15)
GLOBULIN PLAS-MCNC: 3 G/DL (ref 2.5–3.7)
GLUCOSE SERPL-MCNC: 108 MG/DL (ref 70–99)
HCT VFR BLD AUTO: 32.7 % (ref 35–48)
HGB BLD-MCNC: 10.9 G/DL (ref 12–16)
LYMPHOCYTES # BLD: 0.8 K/UL (ref 1–4)
LYMPHOCYTES NFR BLD: 6 %
MCH RBC QN AUTO: 35 PG (ref 27–32)
MCHC RBC AUTO-ENTMCNC: 33.2 G/DL (ref 32–37)
MCV RBC AUTO: 105.5 FL (ref 80–100)
MONOCYTES # BLD: 1.5 K/UL (ref 0–1)
MONOCYTES NFR BLD: 11 %
NEUTROPHILS # BLD AUTO: 11.5 K/UL (ref 1.8–7.7)
NEUTROPHILS NFR BLD: 81 %
OSMOLALITY UR CALC.SUM OF ELEC: 290 MOSM/KG (ref 275–295)
PLATELET # BLD AUTO: 300 K/UL (ref 140–400)
PMV BLD AUTO: 7.6 FL (ref 7.4–10.3)
POTASSIUM SERPL-SCNC: 4.5 MMOL/L (ref 3.3–5.1)
PROT SERPL-MCNC: 5.1 G/DL (ref 5.9–8.4)
RBC # BLD AUTO: 3.1 M/UL (ref 3.7–5.4)
SODIUM SERPL-SCNC: 138 MMOL/L (ref 136–144)
T3 SERPL-MCNC: 0.12 NG/ML (ref 0.87–1.78)
T4 FREE SERPL-MCNC: 0.78 NG/DL (ref 0.58–1.64)
TSH SERPL-ACNC: 0.42 UIU/ML (ref 0.45–5.33)
VIT B12 SERPL-MCNC: 1434 PG/ML (ref 181–914)
WBC # BLD AUTO: 14.1 K/UL (ref 4–11)

## 2018-03-04 PROCEDURE — 71250 CT THORAX DX C-: CPT | Performed by: INTERNAL MEDICINE

## 2018-03-04 PROCEDURE — 82607 VITAMIN B-12: CPT | Performed by: INTERNAL MEDICINE

## 2018-03-04 PROCEDURE — A4216 STERILE WATER/SALINE, 10 ML: HCPCS | Performed by: INTERNAL MEDICINE

## 2018-03-04 PROCEDURE — 84439 ASSAY OF FREE THYROXINE: CPT | Performed by: INTERNAL MEDICINE

## 2018-03-04 PROCEDURE — 82140 ASSAY OF AMMONIA: CPT | Performed by: INTERNAL MEDICINE

## 2018-03-04 PROCEDURE — 85025 COMPLETE CBC W/AUTO DIFF WBC: CPT | Performed by: INTERNAL MEDICINE

## 2018-03-04 PROCEDURE — 80053 COMPREHEN METABOLIC PANEL: CPT | Performed by: INTERNAL MEDICINE

## 2018-03-04 PROCEDURE — 94668 MNPJ CHEST WALL SBSQ: CPT

## 2018-03-04 PROCEDURE — 94667 MNPJ CHEST WALL 1ST: CPT

## 2018-03-04 PROCEDURE — 84443 ASSAY THYROID STIM HORMONE: CPT | Performed by: INTERNAL MEDICINE

## 2018-03-04 PROCEDURE — 84480 ASSAY TRIIODOTHYRONINE (T3): CPT | Performed by: INTERNAL MEDICINE

## 2018-03-04 RX ORDER — DOCUSATE SODIUM 100 MG/1
100 CAPSULE, LIQUID FILLED ORAL 2 TIMES DAILY
Status: DISCONTINUED | OUTPATIENT
Start: 2018-03-04 | End: 2018-03-12

## 2018-03-04 RX ORDER — FUROSEMIDE 10 MG/ML
20 INJECTION INTRAMUSCULAR; INTRAVENOUS ONCE
Status: COMPLETED | OUTPATIENT
Start: 2018-03-05 | End: 2018-03-05

## 2018-03-04 RX ORDER — BISACODYL 10 MG
10 SUPPOSITORY, RECTAL RECTAL
Status: DISCONTINUED | OUTPATIENT
Start: 2018-03-04 | End: 2018-03-12

## 2018-03-04 RX ORDER — FUROSEMIDE 10 MG/ML
20 INJECTION INTRAMUSCULAR; INTRAVENOUS ONCE
Status: COMPLETED | OUTPATIENT
Start: 2018-03-04 | End: 2018-03-04

## 2018-03-04 RX ORDER — MAGNESIUM CARB/ALUMINUM HYDROX 105-160MG
200 TABLET,CHEWABLE ORAL ONCE
Status: COMPLETED | OUTPATIENT
Start: 2018-03-04 | End: 2018-03-04

## 2018-03-04 NOTE — PROGRESS NOTES
[facility] AND [facility] Infectious Disease Progress Note    Tiffany Holliday Patient Status:  Inpatient    12/15/1928 MRN [id number]   Location [facility] 4W/SW/SE Attending Sid Negron MD   Hosp Day # 5 PCP Michael Walker MD Signs: Blood pressure (!) 172/69, pulse 106, temperature 101.9 °F (38.8 °C), temperature source Oral, resp. rate 18, height 5' (1.524 m), weight 111 lb 4.8 oz (50.5 kg), SpO2 93 %, not currently breastfeeding. Temp (24hrs), Av °F (37.2 °C), Min:97. 8 back pain,   - UA abn, Cul E coli,   - blood cul growing the same,   - Noted urology input,   - US with no hydronephrosis,   - has gallegos in place now sec to distended UB,   - CT abd and Pelvis with no hydronephrosis and Cystitis,   - PCT 5.03,      3.  Hyp

## 2018-03-04 NOTE — CONSULTS
Pulmonary / Critical Care H&P/Consult       NAME: Tiffany Holliday - ROOM: [address] [place] [address] - MRN: [id number] - Age: 80year old - :  12/15/1928    Date of Admission: 2018  8:03 PM  Admission Diagnosis: Sepsis due to urinary tract infection ([facility] [A41.9, MANAGEMENT  1/8/2013: FLUOR GID & Agustina Greenwooda NDL/CATH SPI DX/THER IHW      Comment: Procedure: CAUDAL;  Surgeon: Liliam Askew MD;  Location: [facility] MA[facility]  8/29/2012: INJ FOR SACROILIAC JT ANESTH      Comment: Procedure: SI J ;  Location: [address] [place] [address]  5/20/2016: INJECTION, W/WO CONTRAST, DX/THERAPEUTIC SUBST* N/A      Comment: Procedure: LUMBAR / TRANSFORAMINAL EPIDURAL                STEROID INJECTION;  Surgeon: Cottie Nissen, DO;  Location MD;  Location: [address] [place] [address] Michela Farley MANAGEMENT  10/17/2012: PATIENT [place] Ayla PREOPERATIVE ORDER FOR IV ANT*      Comment: Procedure: BURSA INJECTION;  Surgeon: Evan Cunningham MD;  Location: [address] [place] [address] total) by mouth daily. Disp: 90 tablet Rfl: 2   Levothyroxine Sodium 25 MCG Oral Tab Take 1 tablet (25 mcg total) by mouth before breakfast. Disp: 90 tablet Rfl: 3   HYDROcodone-acetaminophen  MG Oral Tab Take 1 tablet by mouth every 8 (eight) hours. Ht 5' (1.524 m)   Wt 111 lb 4.8 oz (50.5 kg)   SpO2 93%   BMI 21.74 kg/m²     General Appearance:    Alert, cooperative, no distress, appears stated age   Head:    Normocephalic, without obvious abnormality, atraumatic   Eyes:    PERRL, conjunctiva/corneas bronchiectasis. ASSESSMENT/PLAN:    1. Fevers: persistent. Initially with UTI / bacteremia, but fevers have persisted despite adequate abx. Denies sx of a chest infection such as cough, dyspnea, chest pain.   Does have effusions bilat R > L, however eff

## 2018-03-04 NOTE — PLAN OF CARE
NEUROLOGICAL - ADULT    • Achieves stable or improved neurological status Not Progressing    Patient still confused and disoriented at times.         CARDIOVASCULAR - ADULT    • Maintains optimal cardiac output and hemodynamic stability Progressing    • Abs

## 2018-03-04 NOTE — PROGRESS NOTES
[facility]    Progress Note    Edwinakerry Ny Patient Status:  Inpatient    12/15/1928 MRN [id number]   Location [facility] 4W/SW/SE Attending Dearangela Conde MD   Hosp Day # 5 PCP MD Carolyn Teran 08/11/2011   PT 13.6 08/11/2011   T4F 0.78 03/04/2018   TSH 0.42 (L) 03/04/2018   CRP 0.20 01/22/2018   B12 1,434 (H) 03/04/2018       Ct Abdomen+pelvis(cpt=74176)    Result Date: 3/3/2018  CONCLUSION:   Large amount of excessive stool seen throughout the

## 2018-03-05 PROBLEM — R79.89 LOW SERUM TRIIODOTHYRONINE (T3): Status: ACTIVE | Noted: 2018-03-05

## 2018-03-05 PROCEDURE — 97116 GAIT TRAINING THERAPY: CPT

## 2018-03-05 PROCEDURE — 94668 MNPJ CHEST WALL SBSQ: CPT

## 2018-03-05 PROCEDURE — 97535 SELF CARE MNGMENT TRAINING: CPT

## 2018-03-05 PROCEDURE — A4216 STERILE WATER/SALINE, 10 ML: HCPCS | Performed by: INTERNAL MEDICINE

## 2018-03-05 PROCEDURE — 97110 THERAPEUTIC EXERCISES: CPT

## 2018-03-05 PROCEDURE — 97530 THERAPEUTIC ACTIVITIES: CPT

## 2018-03-05 RX ORDER — LIOTHYRONINE SODIUM 25 UG/1
25 TABLET ORAL DAILY
Status: DISCONTINUED | OUTPATIENT
Start: 2018-03-05 | End: 2018-03-12

## 2018-03-05 NOTE — PROGRESS NOTES
Stephanie Clark is a 80year old female. Patient presents with:  Altered Mental Status (neurologic)      HPI:    Awake conversant comfortable but not completely herself    REVIEW OF SYSTEMS:   A comprehensive 11 point review of systems was completed.   P Surgeon:                Annel Grimm MD;  Location: [place] [address] MANAGEMENT  9/19/2012: INJ FOR SACROILIAC JT ANESTH      Comment: Procedure: SI JOINT INJECTION;  Surgeon:                Annel Grimm MD;  [facility] [address] [place] [address] [facility]  8/29/2012: BRITANY BY  PHYS PERFRMG SV 5+ YR      Comment: Procedure: SI JOINT INJECTION;  Surgeon:                Misha Ott MD;  Location: [address]  9/19/2012: BRITANY BY  PHYS [id number] [address] SV 5+ YR MANAGEMENT  11/29/2012: PATIENT [place] Ayla PREOPERATIVE ORDER FOR IV ANT*      Comment: Procedure: LUMBAR EPIDURAL;  Surgeon:                Vu Edwards MD;  Location: [address] [place] [address] Drive MANAGEMENT  1/8/2013: PATIENT [facility] serg,      4.  Immunocompromised state: sec to RA, MTX and also prednisone at home,   - will treat infection aggressively,      5.  Spine stimulator in the back: no tenderness noted,   - CT scan with no fluid or inflammation, ( done with out contrast) Yellow Yellow   Clarity Urine Cloudy (A) Clear   Spec Gravity 1.017 1.002 - 1.035   pH Urine 5.0 5.0 - 8.0   Protein Urine 100  (A) Negative mg/dL   Glucose Urine Negative Negative mg/dL   Ketones Urine Negative Negative mg/dL   Bilirubin Urine Negative Ne Chloride 99 95 - 110 mmol/L   CO2 22 22 - 32 mmol/L   BUN 39 (H) 8 - 20 mg/dL   Creatinine 2.40 (H) 0.50 - 1.50 mg/dL   Calcium, Total 7.0 (L) 8.5 - 10.5 mg/dL   BUN/CREA Ratio 16.3 10.0 - 20.0   Anion Gap 7 0 - 18 mmol/L   Calculated Osmolality 277 275 22 - 32 mmol/L   BUN 28 (H) 8 - 20 mg/dL   Creatinine 1.65 (H) 0.50 - 1.50 mg/dL   Calcium, Total 7.7 (L) 8.5 - 10.5 mg/dL   BUN/CREA Ratio 17.0 10.0 - 20.0   Anion Gap 8 0 - 18 mmol/L   Calculated Osmolality 293 275 - 295 mOsm/kg   GFR, Non-African Americ mmol/L   BUN/CREA Ratio 14.2 10.0 - 20.0   Calculated Osmolality 290 275 - 295 mOsm/kg   GFR, Non-African American 29 (L) >=60   GFR, -American 34 (L) >=60   -T4, FREE (S)   Result Value Ref Range   Free T4 0.78 0.58 - 1.64 ng/dL   -TRIIODOTHYRONINE 3. 90 3.70 - 5.40 M/UL   HGB 13.7 12.0 - 16.0 g/dL   HCT 41.0 35.0 - 48.0 %   .0 (H) 80.0 - 100.0 fL   MCH 35.1 (H) 27.0 - 32.0 pg   MCHC 33.5 32.0 - 37.0 g/dl   RDW 15.1 (H) 11.0 - 15.0 %    140 - 400 K/UL   MPV 8.0 7.4 - 10.3 fL   Neutrophil 18.0 (H) 4.0 - 11.0 K/UL   RBC 3.36 (L) 3.70 - 5.40 M/UL   HGB 11.8 (L) 12.0 - 16.0 g/dL   HCT 35.7 35.0 - 48.0 %   .3 (H) 80.0 - 100.0 fL   MCH 35.1 (H) 27.0 - 32.0 pg   MCHC 33.0 32.0 - 37.0 g/dl   RDW 15.5 (H) 11.0 - 15.0 %    140 - 400 K/ DIFFERENTIAL   Result Value Ref Range   WBC 14.1 (H) 4.0 - 11.0 K/UL   RBC 3.10 (L) 3.70 - 5.40 M/UL   HGB 10.9 (L) 12.0 - 16.0 g/dL   HCT 32.7 (L) 35.0 - 48.0 %   .5 (H) 80.0 - 100.0 fL   MCH 35.0 (H) 27.0 - 32.0 pg   MCHC 33.2 32.0 - 37.0 g/dl   R

## 2018-03-05 NOTE — PROGRESS NOTES
[facility]    Progress Note    Jake Milan Patient Status:  Inpatient    12/15/1928 MRN [id number]   Location [facility] 4W/SW/SE Attending Rosalinda Landry MD   Hosp Day # 6 PCP MD Becca Patrick Moderate right and small left pleural effusions. Near complete atelectasis of the right lower lobe and partial atelectasis of the left lower lobe. Underlying pneumonia can have a similar appearance.   Extensive volume loss and bronchiectasis within the ri

## 2018-03-05 NOTE — CM/SW NOTE
KAY spoke with the pt's dtr. Virgen Do [phone] regarding SNF options. Virgen Do stated she isn't sure what she did with the list.  KAY explained the list again and emailed the information to Virgen Do.   Virgen Do is agreeable to having her top 3 choices, to be sure the fa

## 2018-03-05 NOTE — PHYSICAL THERAPY NOTE
PHYSICAL THERAPY TREATMENT NOTE - INPATIENT     Room Number: 444/444-A       Presenting Problem:  (Sepsis secondary to UTI and blood infection )    Problem List  Principal Problem:    Sepsis due to urinary tract infection ([facility]  Active Problems:    Lumbar Sitting down on and standing up from a chair with arms (e.g., wheelchair, bedside commode, etc.): A Little   -   Moving from lying on back to sitting on the side of the bed?: A Little   How much help from another person does the patient currently need. ..

## 2018-03-05 NOTE — PROGRESS NOTES
Pulmonary Progress Note     Assessment / Plan:  1. Fevers: initially with UTI / bacteremia, but fevers have persisted despite adequate abx. Repeat cultures with no growth. Does have bilateral pleural effusions.  Last fever yesterday morning  - abx per ID  -

## 2018-03-05 NOTE — OCCUPATIONAL THERAPY NOTE
OCCUPATIONAL THERAPY TREATMENT NOTE - INPATIENT        Room Number: 444/444-A           Presenting Problem: Sepsis D/T UTI    Problem List  Principal Problem:    Sepsis due to urinary tract infection ([facility]  Active Problems:    Lumbar spinal stenosis    Dewain Cowden at d/c. Patient also may benefit from restorative services for ambulating. Will order. Patient was left in bedside chair with call light in reach and all needs met. Notified RN.     DISCHARGE RECOMMENDATIONS  OT Discharge Recommendations: Sub-acute rehab met;Call light within reach;RN aware of session/findings; All patient questions and concerns addressed; Alarm set; Family present    OT Goals:       Patient will complete functional transfer with SBA. Nacho Perkins will complete toileting with SBA. Kris Martinez

## 2018-03-06 ENCOUNTER — APPOINTMENT (OUTPATIENT)
Dept: CV DIAGNOSTICS | Facility: HOSPITAL | Age: 83
DRG: 871 | End: 2018-03-06
Attending: INTERNAL MEDICINE
Payer: MEDICARE

## 2018-03-06 LAB
ALBUMIN SERPL BCP-MCNC: 2.4 G/DL (ref 3.5–4.8)
ALBUMIN/GLOB SERPL: 0.8 {RATIO} (ref 1–2)
ALP SERPL-CCNC: 110 U/L (ref 32–100)
ALT SERPL-CCNC: 16 U/L (ref 14–54)
ANION GAP SERPL CALC-SCNC: 8 MMOL/L (ref 0–18)
AST SERPL-CCNC: 20 U/L (ref 15–41)
BASOPHILS # BLD: 0.1 K/UL (ref 0–0.2)
BASOPHILS NFR BLD: 1 %
BILIRUB SERPL-MCNC: 0.7 MG/DL (ref 0.3–1.2)
BUN SERPL-MCNC: 22 MG/DL (ref 8–20)
BUN/CREAT SERPL: 12.8 (ref 10–20)
CALCIUM SERPL-MCNC: 8.6 MG/DL (ref 8.5–10.5)
CHLORIDE SERPL-SCNC: 102 MMOL/L (ref 95–110)
CO2 SERPL-SCNC: 29 MMOL/L (ref 22–32)
CREAT SERPL-MCNC: 1.72 MG/DL (ref 0.5–1.5)
CRP SERPL-MCNC: 7.5 MG/DL (ref 0–0.9)
EOSINOPHIL # BLD: 0.3 K/UL (ref 0–0.7)
EOSINOPHIL NFR BLD: 3 %
ERYTHROCYTE [DISTWIDTH] IN BLOOD BY AUTOMATED COUNT: 15 % (ref 11–15)
GLOBULIN PLAS-MCNC: 3 G/DL (ref 2.5–3.7)
GLUCOSE SERPL-MCNC: 105 MG/DL (ref 70–99)
HCT VFR BLD AUTO: 32.6 % (ref 35–48)
HGB BLD-MCNC: 11 G/DL (ref 12–16)
INR BLD: 1.2 (ref 0.9–1.2)
LYMPHOCYTES # BLD: 1.4 K/UL (ref 1–4)
LYMPHOCYTES NFR BLD: 12 %
MCH RBC QN AUTO: 34.8 PG (ref 27–32)
MCHC RBC AUTO-ENTMCNC: 33.6 G/DL (ref 32–37)
MCV RBC AUTO: 103.5 FL (ref 80–100)
MONOCYTES # BLD: 1.2 K/UL (ref 0–1)
MONOCYTES NFR BLD: 10 %
NEUTROPHILS # BLD AUTO: 8.7 K/UL (ref 1.8–7.7)
NEUTROPHILS NFR BLD: 75 %
OSMOLALITY UR CALC.SUM OF ELEC: 292 MOSM/KG (ref 275–295)
PLATELET # BLD AUTO: 463 K/UL (ref 140–400)
PMV BLD AUTO: 7.5 FL (ref 7.4–10.3)
POTASSIUM SERPL-SCNC: 3.7 MMOL/L (ref 3.3–5.1)
PROT SERPL-MCNC: 5.4 G/DL (ref 5.9–8.4)
PROTHROMBIN TIME: 14.4 SECONDS (ref 11.8–14.5)
RBC # BLD AUTO: 3.15 M/UL (ref 3.7–5.4)
SODIUM SERPL-SCNC: 139 MMOL/L (ref 136–144)
WBC # BLD AUTO: 11.7 K/UL (ref 4–11)

## 2018-03-06 PROCEDURE — 93306 TTE W/DOPPLER COMPLETE: CPT | Performed by: INTERNAL MEDICINE

## 2018-03-06 PROCEDURE — 85025 COMPLETE CBC W/AUTO DIFF WBC: CPT | Performed by: INTERNAL MEDICINE

## 2018-03-06 PROCEDURE — A4216 STERILE WATER/SALINE, 10 ML: HCPCS | Performed by: INTERNAL MEDICINE

## 2018-03-06 PROCEDURE — 80053 COMPREHEN METABOLIC PANEL: CPT | Performed by: INTERNAL MEDICINE

## 2018-03-06 PROCEDURE — 86140 C-REACTIVE PROTEIN: CPT | Performed by: INTERNAL MEDICINE

## 2018-03-06 PROCEDURE — 97116 GAIT TRAINING THERAPY: CPT

## 2018-03-06 PROCEDURE — 85610 PROTHROMBIN TIME: CPT | Performed by: INTERNAL MEDICINE

## 2018-03-06 PROCEDURE — 97110 THERAPEUTIC EXERCISES: CPT

## 2018-03-06 RX ORDER — HYDROCODONE BITARTRATE AND ACETAMINOPHEN 10; 325 MG/1; MG/1
1 TABLET ORAL 2 TIMES DAILY PRN
Status: DISCONTINUED | OUTPATIENT
Start: 2018-03-06 | End: 2018-03-12

## 2018-03-06 RX ORDER — POTASSIUM CHLORIDE 20 MEQ/1
40 TABLET, EXTENDED RELEASE ORAL ONCE
Status: COMPLETED | OUTPATIENT
Start: 2018-03-06 | End: 2018-03-06

## 2018-03-06 NOTE — PROGRESS NOTES
[facility]    Progress Note    Collette Garrison Patient Status:  Inpatient    12/15/1928 MRN [id number]   Location [facility] 4W/SW/SE Attending Shubham Encarnacion MD   Hosp Day # 7 PCP MD Evelyn Kumar pronounced at the middle lobe and lingula. No evidence of parenchymal lung abscess as clinically questioned. 2. Worsening appearance of the chest may reflect worsening congestive failure and/or fluid overload. Recommend followup chest x-ray evaluation.  Con

## 2018-03-06 NOTE — PROGRESS NOTES
[facility] AND [facility] Infectious Disease Progress Note    Collette Garrison Patient Status:  Inpatient    12/15/1928 MRN [id number]   Location [facility] 4W/SW/SE Attending Shubham Encarnacion MD   Hosp Day # 7 PCP Jazzmine Ji MD day  •  ondansetron HCl (ZOFRAN) injection 4 mg, 4 mg, Intravenous, Q6H PRN  •  acetaminophen (TYLENOL) tab 650 mg, 650 mg, Oral, Q6H PRN    Physical Exam:  General: Alert, orientated x3. Cooperative. No apparent distress.   Vital Signs:  Blood pressure 1 blood cul are NGTD.   - sec to  related infection, Rule out associated Aspiration Pneumonia,   - also has some phlebitis of L wrist,   - On IV Zosyn, pharm to dose, d # 6,      2.  Cystitis: possible Pyelo with back pain,   - UA abn, Cul E coli,   - blood

## 2018-03-06 NOTE — PROGRESS NOTES
[facility]    CDI Prediction Tool Protocol (Vancomycin Initiated)    OVP (oral vancomycin prophylaxis) 125 mg PO BID is being started in this patient based on a score of 13.       Score Breakdown:  High risk antibiotic use (5 points)  [facility]

## 2018-03-06 NOTE — PHYSICAL THERAPY NOTE
PHYSICAL THERAPY TREATMENT NOTE - INPATIENT     Room Number: 444/444-A       Presenting Problem:  (Sepsis secondary to UTI and blood infection )    Problem List  Principal Problem:    Sepsis due to urinary tract infection ([facility]  Active Problems:    Lumbar bedside commode, etc.): A Little   -   Moving from lying on back to sitting on the side of the bed?: A Little   How much help from another person does the patient currently need. ..   -   Moving to and from a bed to a chair (including a wheelchair)?: A Vijay

## 2018-03-06 NOTE — PROGRESS NOTES
Pulmonary Progress Note      NAME: Misael Kauffman - ROOM: 161/166-G - MRN: [id number] - Age: 80year old - : 12/15/1928    Assessment/Plan:  1. Fevers: initially with UTI / bacteremia, but fevers have persisted despite adequate abx.  Repeat cultures wi noted.   Lungs: diminished   Chest wall: No tenderness or deformity. Heart: Regular rate and rhythm, normal S1S2, no murmur. Abdomen: soft, non-tender, non-distended, positive BS.    Extremity: no edema    Recent Labs   Lab  03/06/18   0450   RBC  3.15*

## 2018-03-07 ENCOUNTER — APPOINTMENT (OUTPATIENT)
Dept: GENERAL RADIOLOGY | Facility: HOSPITAL | Age: 83
DRG: 871 | End: 2018-03-07
Attending: INTERNAL MEDICINE
Payer: MEDICARE

## 2018-03-07 LAB
ANION GAP SERPL CALC-SCNC: 12 MMOL/L (ref 0–18)
BASOPHILS # BLD: 0.1 K/UL (ref 0–0.2)
BASOPHILS NFR BLD: 1 %
BUN SERPL-MCNC: 22 MG/DL (ref 8–20)
BUN/CREAT SERPL: 14.4 (ref 10–20)
CALCIUM SERPL-MCNC: 8.6 MG/DL (ref 8.5–10.5)
CHLORIDE SERPL-SCNC: 104 MMOL/L (ref 95–110)
CO2 SERPL-SCNC: 24 MMOL/L (ref 22–32)
CREAT SERPL-MCNC: 1.53 MG/DL (ref 0.5–1.5)
EOSINOPHIL # BLD: 0.4 K/UL (ref 0–0.7)
EOSINOPHIL NFR BLD: 3 %
ERYTHROCYTE [DISTWIDTH] IN BLOOD BY AUTOMATED COUNT: 15.2 % (ref 11–15)
GLUCOSE SERPL-MCNC: 100 MG/DL (ref 70–99)
HCT VFR BLD AUTO: 31.3 % (ref 35–48)
HGB BLD-MCNC: 10.7 G/DL (ref 12–16)
LYMPHOCYTES # BLD: 1.5 K/UL (ref 1–4)
LYMPHOCYTES NFR BLD: 10 %
MCH RBC QN AUTO: 35.2 PG (ref 27–32)
MCHC RBC AUTO-ENTMCNC: 34 G/DL (ref 32–37)
MCV RBC AUTO: 103.3 FL (ref 80–100)
MONOCYTES # BLD: 1.3 K/UL (ref 0–1)
MONOCYTES NFR BLD: 9 %
NEUTROPHILS # BLD AUTO: 11.2 K/UL (ref 1.8–7.7)
NEUTROPHILS NFR BLD: 78 %
OSMOLALITY UR CALC.SUM OF ELEC: 293 MOSM/KG (ref 275–295)
PLATELET # BLD AUTO: 460 K/UL (ref 140–400)
PMV BLD AUTO: 7.2 FL (ref 7.4–10.3)
POTASSIUM SERPL-SCNC: 3.9 MMOL/L (ref 3.3–5.1)
POTASSIUM SERPL-SCNC: 3.9 MMOL/L (ref 3.3–5.1)
RBC # BLD AUTO: 3.03 M/UL (ref 3.7–5.4)
SODIUM SERPL-SCNC: 140 MMOL/L (ref 136–144)
WBC # BLD AUTO: 14.4 K/UL (ref 4–11)

## 2018-03-07 PROCEDURE — A4216 STERILE WATER/SALINE, 10 ML: HCPCS

## 2018-03-07 PROCEDURE — 85025 COMPLETE CBC W/AUTO DIFF WBC: CPT | Performed by: INTERNAL MEDICINE

## 2018-03-07 PROCEDURE — 97530 THERAPEUTIC ACTIVITIES: CPT

## 2018-03-07 PROCEDURE — 84132 ASSAY OF SERUM POTASSIUM: CPT | Performed by: INTERNAL MEDICINE

## 2018-03-07 PROCEDURE — 97110 THERAPEUTIC EXERCISES: CPT

## 2018-03-07 PROCEDURE — 80048 BASIC METABOLIC PNL TOTAL CA: CPT | Performed by: INTERNAL MEDICINE

## 2018-03-07 PROCEDURE — 71045 X-RAY EXAM CHEST 1 VIEW: CPT | Performed by: INTERNAL MEDICINE

## 2018-03-07 PROCEDURE — 97116 GAIT TRAINING THERAPY: CPT

## 2018-03-07 RX ORDER — CEFUROXIME AXETIL 500 MG/1
500 TABLET ORAL DAILY
Qty: 7 TABLET | Refills: 0 | Status: SHIPPED | OUTPATIENT
Start: 2018-03-07 | End: 2018-03-14

## 2018-03-07 RX ORDER — 0.9 % SODIUM CHLORIDE 0.9 %
VIAL (ML) INJECTION
Status: DISPENSED
Start: 2018-03-07 | End: 2018-03-08

## 2018-03-07 RX ORDER — CLONAZEPAM 0.5 MG/1
0.75 TABLET ORAL 2 TIMES DAILY PRN
Status: DISCONTINUED | OUTPATIENT
Start: 2018-03-07 | End: 2018-03-12

## 2018-03-07 RX ORDER — POLYVINYL ALCOHOL 14 MG/ML
1 SOLUTION/ DROPS OPHTHALMIC 4 TIMES DAILY PRN
Status: DISCONTINUED | OUTPATIENT
Start: 2018-03-07 | End: 2018-03-12

## 2018-03-07 NOTE — PROGRESS NOTES
Pulmonary Progress Note     Assessment / Plan:  1. Fevers: due to UTI / bacteremia. Resolved  - abx per ID  2. Pleural effusions: likely due to volume overload and malnutrition  - repeat CXR  - thora if recurrent fever  3.  Bronchiectasis  - monitor    [address]

## 2018-03-07 NOTE — CM/SW NOTE
MD order receive regarding rehab placement as the pt. May be ready to discharge tomorrow. KAY contacted the pt's dtr. Radha Monday [phone] regarding rehab options.   She and her family have been looking into places that have assisted and independent living as

## 2018-03-07 NOTE — PHYSICAL THERAPY NOTE
PHYSICAL THERAPY TREATMENT NOTE - INPATIENT     Room Number: 444/444-A       Presenting Problem:  (Sepsis secondary to UTI and blood infection )    Problem List  Principal Problem:    Sepsis due to urinary tract infection ([facility]  Active Problems:    Lumbar down on and standing up from a chair with arms (e.g., wheelchair, bedside commode, etc.): A Little   -   Moving from lying on back to sitting on the side of the bed?: A Little   How much help from another person does the patient currently need. ..   -   Mov

## 2018-03-07 NOTE — DIETARY NOTE
NUTRITION - INITIAL ASSESSMENT    Pt is at high nutrition risk. Pt meets malnutrition criteria.       CRITERIA FOR MALNUTRITION DIAGNOSIS:  Criteria for severe malnutrition diagnosis: chronic illness related to energy intake less than 75% for greater than tests) 8/29/2013   • Actinic keratosis 1/15/2013   • Anxiety state, unspecified    • Bronchiectasis ([facility] 6/18/2013   • Depression    • HTN (hypertension)    • HX: breast cancer    • Hypothyroidism, unspecified type 2/22/2017   • Ischemic colitis ([facility] 2/6 oz)  10/04/16 : 40.8 kg (90 lb)      GASTROINTESTINAL: GI intact    FOOD/NUTRITION RELATED HISTORY:  Appetite: Fair to good  Intake:20,15,20, but 100% breakfast this am.  Intake Meeting Needs: No, but supplements to maximize  Food Allergies: No  Cultural/E

## 2018-03-07 NOTE — PROGRESS NOTES
[facility]    Progress Note    Misael Kauffman Patient Status:  Inpatient    12/15/1928 MRN [id number]   Location [facility] 4W/SW/SE Attending Max Singh MD   Hosp Day # 8 PCP Viet Bryant MD     Chao Low serum triiodothyronine (T3)    Continued improvement. Echocardiogram did not show any evidence for valvular vegetations. Follow-up urine and blood cultures have been negative so far.   Continue with current Zosyn--hopefully can be changed to oral medi

## 2018-03-07 NOTE — PROGRESS NOTES
[facility] AND [facility] Infectious Disease Progress Note    Thang Reyes Patient Status:  Inpatient    12/15/1928 MRN [id number]   Location [facility] 4W/SW/SE Attending Gisela Grace MD   Hosp Day # 8 PCP Donald Fernandez MD mL, 3 mL, Intravenous, PRN  •  Heparin Sodium (Porcine) 5000 UNIT/ML injection 5,000 Units, 5,000 Units, Subcutaneous, 2 times per day  •  ondansetron HCl (ZOFRAN) injection 4 mg, 4 mg, Intravenous, Q6H PRN  •  acetaminophen (TYLENOL) tab 650 mg, 650 mg, O UA cultures with e coli  -US negative for hydronephrosis  -CT with cystitis  -on abx above  3. Hypoxia  -weaned off O2, on room air  -also with fluid overload  -CXR with bibasilar consolidation/effusion  -pulmonary on consult  4.   Immunocompromised  -RA

## 2018-03-08 ENCOUNTER — APPOINTMENT (OUTPATIENT)
Dept: ULTRASOUND IMAGING | Facility: HOSPITAL | Age: 83
DRG: 871 | End: 2018-03-08
Attending: INTERNAL MEDICINE
Payer: MEDICARE

## 2018-03-08 PROBLEM — E43 SEVERE PROTEIN-CALORIE MALNUTRITION (HCC): Status: ACTIVE | Noted: 2018-03-08

## 2018-03-08 LAB
ANION GAP SERPL CALC-SCNC: 8 MMOL/L (ref 0–18)
BASOPHILS # BLD: 0.1 K/UL (ref 0–0.2)
BASOPHILS NFR BLD: 1 %
BUN SERPL-MCNC: 23 MG/DL (ref 8–20)
BUN/CREAT SERPL: 14.6 (ref 10–20)
CALCIUM SERPL-MCNC: 9 MG/DL (ref 8.5–10.5)
CHLORIDE SERPL-SCNC: 106 MMOL/L (ref 95–110)
CO2 SERPL-SCNC: 28 MMOL/L (ref 22–32)
CREAT SERPL-MCNC: 1.57 MG/DL (ref 0.5–1.5)
EOSINOPHIL # BLD: 0.3 K/UL (ref 0–0.7)
EOSINOPHIL NFR BLD: 2 %
ERYTHROCYTE [DISTWIDTH] IN BLOOD BY AUTOMATED COUNT: 15.4 % (ref 11–15)
GLUCOSE SERPL-MCNC: 91 MG/DL (ref 70–99)
HCT VFR BLD AUTO: 31.5 % (ref 35–48)
HGB BLD-MCNC: 10.6 G/DL (ref 12–16)
LYMPHOCYTES # BLD: 1.7 K/UL (ref 1–4)
LYMPHOCYTES NFR BLD: 11 %
MCH RBC QN AUTO: 34.9 PG (ref 27–32)
MCHC RBC AUTO-ENTMCNC: 33.7 G/DL (ref 32–37)
MCV RBC AUTO: 103.4 FL (ref 80–100)
MONOCYTES # BLD: 1.3 K/UL (ref 0–1)
MONOCYTES NFR BLD: 9 %
NEUTROPHILS # BLD AUTO: 12 K/UL (ref 1.8–7.7)
NEUTROPHILS NFR BLD: 78 %
OSMOLALITY UR CALC.SUM OF ELEC: 297 MOSM/KG (ref 275–295)
PLATELET # BLD AUTO: 502 K/UL (ref 140–400)
PMV BLD AUTO: 7.2 FL (ref 7.4–10.3)
POTASSIUM SERPL-SCNC: 4 MMOL/L (ref 3.3–5.1)
PROCALCITONIN SERPL-MCNC: 0.44 NG/ML (ref ?–0.11)
RBC # BLD AUTO: 3.04 M/UL (ref 3.7–5.4)
SODIUM SERPL-SCNC: 142 MMOL/L (ref 136–144)
WBC # BLD AUTO: 15.5 K/UL (ref 4–11)

## 2018-03-08 PROCEDURE — 84145 PROCALCITONIN (PCT): CPT | Performed by: INTERNAL MEDICINE

## 2018-03-08 PROCEDURE — 97530 THERAPEUTIC ACTIVITIES: CPT

## 2018-03-08 PROCEDURE — 87040 BLOOD CULTURE FOR BACTERIA: CPT | Performed by: INTERNAL MEDICINE

## 2018-03-08 PROCEDURE — 97110 THERAPEUTIC EXERCISES: CPT

## 2018-03-08 PROCEDURE — 85025 COMPLETE CBC W/AUTO DIFF WBC: CPT | Performed by: INTERNAL MEDICINE

## 2018-03-08 PROCEDURE — 97116 GAIT TRAINING THERAPY: CPT

## 2018-03-08 PROCEDURE — 76604 US EXAM CHEST: CPT | Performed by: INTERNAL MEDICINE

## 2018-03-08 PROCEDURE — 80048 BASIC METABOLIC PNL TOTAL CA: CPT | Performed by: INTERNAL MEDICINE

## 2018-03-08 NOTE — CDS QUERY
Potential for Impaired Nutritional Status  DOCUMENTATION CLARIFICATION FORM  Dear Doctor:Ross  Clinical information suggests potential for impaired nutritional status.  For accurate ICD-10-CM code assignment to reflect severity of illness and risk of morta

## 2018-03-08 NOTE — PHYSICAL THERAPY NOTE
PHYSICAL THERAPY TREATMENT NOTE - INPATIENT     Room Number: 444/444-A       Presenting Problem:  (Sepsis secondary to UTI and blood infection )    Problem List  Principal Problem:    Sepsis due to urinary tract infection ([facility])  Active Problems:    Lumbar Little   -   Sitting down on and standing up from a chair with arms (e.g., wheelchair, bedside commode, etc.): A Little   -   Moving from lying on back to sitting on the side of the bed?: A Little   How much help from another person does the patient fercho

## 2018-03-08 NOTE — PROGRESS NOTES
Pulmonary Progress Note     Assessment / Plan:  1. Fevers: due to UTI / bacteremia  - given recurrent fever will sample pleural fluid to r/o empyema.  Risks, benefits and alternatives to the procedure discussed with patient and she agreed with proceeding  -

## 2018-03-08 NOTE — PROGRESS NOTES
[facility] AND [facility] Infectious Disease Progress Note    Nani Bunch Patient Status:  Inpatient    12/15/1928 MRN [id number]   Location [facility] 4W/SW/SE Attending Jc Duenas MD   Hosp Day # 9 PCP Florentin Lentz MD Pantoprazole Sodium (PROTONIX) EC tab 40 mg, 40 mg, Oral, QAM AC  •  Normal Saline Flush 0.9 % injection 3 mL, 3 mL, Intravenous, PRN  •  Heparin Sodium (Porcine) 5000 UNIT/ML injection 5,000 Units, 5,000 Units, Subcutaneous, 2 times per day  •  ondansetro AMS and Lethargy,   - Blood cul grew E coli in 2/2 bottles, repeat blood cul are NGTD.   - sec to  related infection, Rule out associated Aspiration Pneumonia,   - also has some phlebitis of L wrist,   - On IV Zosyn, pharm to dose, d # 8,      2.  Cystiti

## 2018-03-08 NOTE — SIGNIFICANT EVENT
Unwitnessed fall from patient in bathroom. Patient immediately assessed and no obvious signs of injury. Vital signs stable. Awaiting MD response.

## 2018-03-08 NOTE — PROGRESS NOTES
Bedside ultrasound with small right pleural effusion <2cm and tiny left pleural effusion that are unable to be safely sampled.  Given the pleural effusions have significantly improved and are small in nature this is unlikely the cause of her continued fever

## 2018-03-08 NOTE — PROGRESS NOTES
[facility]    Progress Note    Marisela Person Patient Status:  Inpatient    12/15/1928 MRN [id number]   Location [facility] 4W/SW/SE Attending Corey Shields MD   Hosp Day # 9 PCP MD Nazia Ortez 2018. Findings are new when compared to chest x-ray dated February 26, 2018. 2. Cardiomegaly. 3. Atherosclerosis. 4. Hyperinflation. 5. Demineralization. 6. Intracanal electrodes in the lower thoracic region.                Assessment and Plan:   Principal

## 2018-03-08 NOTE — PLAN OF CARE
Safety Risk - Non-Violent Restraints    • Patient will remain free from self-harm Completed          CARDIOVASCULAR - ADULT    • Maintains optimal cardiac output and hemodynamic stability Progressing    • Absence of cardiac arrhythmias or at baseline Progr

## 2018-03-09 ENCOUNTER — APPOINTMENT (OUTPATIENT)
Dept: GENERAL RADIOLOGY | Facility: HOSPITAL | Age: 83
DRG: 871 | End: 2018-03-09
Attending: INTERNAL MEDICINE
Payer: MEDICARE

## 2018-03-09 LAB
ANION GAP SERPL CALC-SCNC: 7 MMOL/L (ref 0–18)
BASOPHILS # BLD: 0 K/UL (ref 0–0.2)
BASOPHILS NFR BLD: 0 %
BILIRUB UR QL: NEGATIVE
BUN SERPL-MCNC: 24 MG/DL (ref 8–20)
BUN/CREAT SERPL: 15.5 (ref 10–20)
CALCIUM SERPL-MCNC: 9 MG/DL (ref 8.5–10.5)
CHLORIDE SERPL-SCNC: 108 MMOL/L (ref 95–110)
CLARITY UR: CLEAR
CO2 SERPL-SCNC: 26 MMOL/L (ref 22–32)
COLOR UR: YELLOW
CREAT SERPL-MCNC: 1.55 MG/DL (ref 0.5–1.5)
EOSINOPHIL # BLD: 0.4 K/UL (ref 0–0.7)
EOSINOPHIL NFR BLD: 2 %
ERYTHROCYTE [DISTWIDTH] IN BLOOD BY AUTOMATED COUNT: 15.5 % (ref 11–15)
GLUCOSE SERPL-MCNC: 94 MG/DL (ref 70–99)
GLUCOSE UR-MCNC: NEGATIVE MG/DL
HCT VFR BLD AUTO: 32.2 % (ref 35–48)
HGB BLD-MCNC: 10.8 G/DL (ref 12–16)
KETONES UR-MCNC: NEGATIVE MG/DL
LYMPHOCYTES # BLD: 1.5 K/UL (ref 1–4)
LYMPHOCYTES NFR BLD: 8 %
MCH RBC QN AUTO: 34.9 PG (ref 27–32)
MCHC RBC AUTO-ENTMCNC: 33.5 G/DL (ref 32–37)
MCV RBC AUTO: 104.1 FL (ref 80–100)
MONOCYTES # BLD: 1.4 K/UL (ref 0–1)
MONOCYTES NFR BLD: 8 %
NEUTROPHILS # BLD AUTO: 14.7 K/UL (ref 1.8–7.7)
NEUTROPHILS NFR BLD: 82 %
NITRITE UR QL STRIP.AUTO: NEGATIVE
OSMOLALITY UR CALC.SUM OF ELEC: 296 MOSM/KG (ref 275–295)
PH UR: 7 [PH] (ref 5–8)
PLATELET # BLD AUTO: 499 K/UL (ref 140–400)
PMV BLD AUTO: 6.9 FL (ref 7.4–10.3)
POTASSIUM SERPL-SCNC: 4.1 MMOL/L (ref 3.3–5.1)
PROT UR-MCNC: NEGATIVE MG/DL
RBC # BLD AUTO: 3.09 M/UL (ref 3.7–5.4)
RBC #/AREA URNS AUTO: 2 /HPF
SODIUM SERPL-SCNC: 141 MMOL/L (ref 136–144)
SP GR UR STRIP: 1.01 (ref 1–1.03)
UROBILINOGEN UR STRIP-ACNC: <2
VIT C UR-MCNC: NEGATIVE MG/DL
WBC # BLD AUTO: 18 K/UL (ref 4–11)
WBC #/AREA URNS AUTO: 23 /HPF

## 2018-03-09 PROCEDURE — 81001 URINALYSIS AUTO W/SCOPE: CPT | Performed by: INTERNAL MEDICINE

## 2018-03-09 PROCEDURE — 80048 BASIC METABOLIC PNL TOTAL CA: CPT | Performed by: INTERNAL MEDICINE

## 2018-03-09 PROCEDURE — 71045 X-RAY EXAM CHEST 1 VIEW: CPT | Performed by: INTERNAL MEDICINE

## 2018-03-09 PROCEDURE — 87086 URINE CULTURE/COLONY COUNT: CPT | Performed by: INTERNAL MEDICINE

## 2018-03-09 PROCEDURE — 97110 THERAPEUTIC EXERCISES: CPT

## 2018-03-09 PROCEDURE — 85025 COMPLETE CBC W/AUTO DIFF WBC: CPT | Performed by: INTERNAL MEDICINE

## 2018-03-09 PROCEDURE — 97116 GAIT TRAINING THERAPY: CPT

## 2018-03-09 RX ORDER — SODIUM PHOSPHATE, DIBASIC AND SODIUM PHOSPHATE, MONOBASIC 7; 19 G/133ML; G/133ML
1 ENEMA RECTAL ONCE AS NEEDED
Status: COMPLETED | OUTPATIENT
Start: 2018-03-09 | End: 2018-03-09

## 2018-03-09 NOTE — PHYSICAL THERAPY NOTE
PHYSICAL THERAPY TREATMENT NOTE - INPATIENT     Room Number: 444/444-A       Presenting Problem:  (Sepsis secondary to UTI and blood infection )    Problem List  Principal Problem:    Sepsis due to urinary tract infection ([facility]  Active Problems:    Lumbar Sitting down on and standing up from a chair with arms (e.g., wheelchair, bedside commode, etc.): A Little   -   Moving from lying on back to sitting on the side of the bed?: A Little   How much help from another person does the patient currently need. ..

## 2018-03-09 NOTE — PROGRESS NOTES
Pulmonary Progress Note     Assessment / Plan:  1. Fevers: due to UTI / bacteremia. PCT significantly decreased  - abx per ID  2. Pleural effusions: likely due to volume overload and malnutrition. Significantly improved.  Bedside ultrasound with only a smal

## 2018-03-09 NOTE — PROGRESS NOTES
[facility] Infectious Disease Progress Note    Namita Pompa Patient Status:  Inpatient    12/15/1928 MRN [id number]   Location [facility] 4W/SW/SE Attending Dari Graham MD   Hosp Day # 10 PCP Coby Jhaveri MD AC  •  Normal Saline Flush 0.9 % injection 3 mL, 3 mL, Intravenous, PRN  •  Heparin Sodium (Porcine) 5000 UNIT/ML injection 5,000 Units, 5,000 Units, Subcutaneous, 2 times per day  •  ondansetron HCl (ZOFRAN) injection 4 mg, 4 mg, Intravenous, Q6H PRN  • repeat blood cul are NGTD.   - sec to  related infection, Rule out associated Aspiration Pneumonia,   - also has some phlebitis of L wrist, which is resolved,   - On IV Zosyn, pharm to dose, d # 9,      2.  Cystitis: possible Pyelo with back pain,   - UA

## 2018-03-09 NOTE — PROGRESS NOTES
[facility]    Progress Note    Adrianyoly Churchill Patient Status:  Inpatient    12/15/1928 MRN [id number]   Location [facility] 4W/SW/SE Attending Kay Crystal MD   Hosp Day # 10 PCP MD Shantel Sharma 6. Intracanal electrodes in the lower thoracic region.                Assessment and Plan:   Principal Problem:    Sepsis due to urinary tract infection ([place] [address]  Active Problems:    Lumbar spinal stenosis    Osteoporosis    HTN (hypertension)    Rheumatoid ar

## 2018-03-10 LAB
BASOPHILS # BLD: 0.2 K/UL (ref 0–0.2)
BASOPHILS NFR BLD: 1 %
EOSINOPHIL # BLD: 0.3 K/UL (ref 0–0.7)
EOSINOPHIL NFR BLD: 2 %
ERYTHROCYTE [DISTWIDTH] IN BLOOD BY AUTOMATED COUNT: 15.5 % (ref 11–15)
HCT VFR BLD AUTO: 32.4 % (ref 35–48)
HGB BLD-MCNC: 10.7 G/DL (ref 12–16)
LYMPHOCYTES # BLD: 1.6 K/UL (ref 1–4)
LYMPHOCYTES NFR BLD: 10 %
MCH RBC QN AUTO: 34.4 PG (ref 27–32)
MCHC RBC AUTO-ENTMCNC: 33.1 G/DL (ref 32–37)
MCV RBC AUTO: 104.1 FL (ref 80–100)
MONOCYTES # BLD: 1.3 K/UL (ref 0–1)
MONOCYTES NFR BLD: 8 %
NEUTROPHILS # BLD AUTO: 12.5 K/UL (ref 1.8–7.7)
NEUTROPHILS NFR BLD: 78 %
PLATELET # BLD AUTO: 482 K/UL (ref 140–400)
PMV BLD AUTO: 6.7 FL (ref 7.4–10.3)
RBC # BLD AUTO: 3.11 M/UL (ref 3.7–5.4)
WBC # BLD AUTO: 16 K/UL (ref 4–11)

## 2018-03-10 PROCEDURE — A4216 STERILE WATER/SALINE, 10 ML: HCPCS | Performed by: INTERNAL MEDICINE

## 2018-03-10 PROCEDURE — 85025 COMPLETE CBC W/AUTO DIFF WBC: CPT | Performed by: INTERNAL MEDICINE

## 2018-03-10 NOTE — PROGRESS NOTES
[facility] AND [facility] Infectious Disease Progress Note    Noland Kussmaul Patient Status:  Inpatient    12/15/1928 MRN [id number]   Location [facility] 4W/SW/SE Attending Radha Vences MD   Hosp Day # 11 PCP Akira Feldman MD Saline Flush 0.9 % injection 3 mL, 3 mL, Intravenous, PRN  •  Heparin Sodium (Porcine) 5000 UNIT/ML injection 5,000 Units, 5,000 Units, Subcutaneous, 2 times per day  •  ondansetron HCl (ZOFRAN) injection 4 mg, 4 mg, Intravenous, Q6H PRN  •  acetaminophen air  -also with fluid overload  -CXR with bibasilar consolidation/effusion  -pulmonary on consult  4. Immunocompromised  -RA   -on MTX and prednisone at home, MTX on hold  5. LTBI  -on INH  6.  Leukocytosis  -slightly elevated today, continue to trend  7.

## 2018-03-10 NOTE — PROGRESS NOTES
[facility]    Progress Note    Lorretta Reusing Patient Status:  Inpatient    12/15/1928 MRN [id number]   Location [facility] 4W/SW/SE Attending Sid Negron MD   Hosp Day # 11 PCP MD Genia Smith (L) 03/04/2018   CRP 7.5 (H) 03/06/2018   B12 1,434 (H) 03/04/2018       Us Chest (ZEZ=[id number])    Result Date: 3/8/2018  CONCLUSION:  1. Small amount of right pleural fluid. No well-defined left pleural effusion. No thoracentesis was performed.          Xr C

## 2018-03-10 NOTE — PLAN OF CARE
Patient ambulating in hallway frequently with staff and family. Awaiting OK from [facility] for transfer.

## 2018-03-11 LAB
ERYTHROCYTE [DISTWIDTH] IN BLOOD BY AUTOMATED COUNT: 15.4 % (ref 11–15)
HCT VFR BLD AUTO: 31.7 % (ref 35–48)
HGB BLD-MCNC: 10.6 G/DL (ref 12–16)
MCH RBC QN AUTO: 34.7 PG (ref 27–32)
MCHC RBC AUTO-ENTMCNC: 33.3 G/DL (ref 32–37)
MCV RBC AUTO: 104.1 FL (ref 80–100)
PLATELET # BLD AUTO: 515 K/UL (ref 140–400)
PMV BLD AUTO: 7.2 FL (ref 7.4–10.3)
RBC # BLD AUTO: 3.05 M/UL (ref 3.7–5.4)
WBC # BLD AUTO: 14.6 K/UL (ref 4–11)

## 2018-03-11 PROCEDURE — 85027 COMPLETE CBC AUTOMATED: CPT | Performed by: INTERNAL MEDICINE

## 2018-03-11 RX ORDER — DIAPER,BRIEF,INFANT-TODD,DISP
EACH MISCELLANEOUS 4 TIMES DAILY PRN
Status: DISCONTINUED | OUTPATIENT
Start: 2018-03-11 | End: 2018-03-12

## 2018-03-11 RX ORDER — DIPHENHYDRAMINE HCL 50 MG
50 CAPSULE ORAL EVERY 6 HOURS PRN
Status: DISCONTINUED | OUTPATIENT
Start: 2018-03-11 | End: 2018-03-11

## 2018-03-11 RX ORDER — DIPHENHYDRAMINE HCL 25 MG
25 CAPSULE ORAL EVERY 6 HOURS PRN
Status: DISCONTINUED | OUTPATIENT
Start: 2018-03-11 | End: 2018-03-12

## 2018-03-11 NOTE — PROGRESS NOTES
[facility]    Progress Note    Mary Lou Israel Patient Status:  Inpatient    12/15/1928 MRN [id number]   Location [facility] [place] 4W/SW/SE Attending Giana Blair MD   Hosp Day # 12 PCP MD Ladan Carpenter 94 03/09/2018   CA 9.0 03/09/2018   ALB 2.4 (L) 03/06/2018   ALKPHO 110 (H) 03/06/2018   BILT 0.7 03/06/2018   TP 5.4 (L) 03/06/2018   AST 20 03/06/2018   ALT 16 03/06/2018   PTT 25.8 08/11/2011   INR 1.2 03/06/2018   PT 13.6 08/11/2011   T4F 0.78 03/04/20

## 2018-03-12 VITALS
DIASTOLIC BLOOD PRESSURE: 67 MMHG | RESPIRATION RATE: 18 BRPM | HEART RATE: 92 BPM | HEIGHT: 60 IN | WEIGHT: 100.19 LBS | TEMPERATURE: 98 F | BODY MASS INDEX: 19.67 KG/M2 | SYSTOLIC BLOOD PRESSURE: 136 MMHG | OXYGEN SATURATION: 97 %

## 2018-03-12 LAB
ERYTHROCYTE [DISTWIDTH] IN BLOOD BY AUTOMATED COUNT: 14.9 % (ref 11–15)
HCT VFR BLD AUTO: 30.8 % (ref 35–48)
HGB BLD-MCNC: 10.3 G/DL (ref 12–16)
MCH RBC QN AUTO: 35 PG (ref 27–32)
MCHC RBC AUTO-ENTMCNC: 33.4 G/DL (ref 32–37)
MCV RBC AUTO: 104.8 FL (ref 80–100)
PLATELET # BLD AUTO: 485 K/UL (ref 140–400)
PMV BLD AUTO: 7.4 FL (ref 7.4–10.3)
RBC # BLD AUTO: 2.94 M/UL (ref 3.7–5.4)
WBC # BLD AUTO: 12.2 K/UL (ref 4–11)

## 2018-03-12 PROCEDURE — 97110 THERAPEUTIC EXERCISES: CPT

## 2018-03-12 PROCEDURE — 97116 GAIT TRAINING THERAPY: CPT

## 2018-03-12 PROCEDURE — 85027 COMPLETE CBC AUTOMATED: CPT | Performed by: INTERNAL MEDICINE

## 2018-03-12 RX ORDER — LIOTHYRONINE SODIUM 25 UG/1
25 TABLET ORAL DAILY
Qty: 30 TABLET | Refills: 6 | Status: SHIPPED | OUTPATIENT
Start: 2018-03-13 | End: 2018-04-06

## 2018-03-12 NOTE — CM/SW NOTE
KAY met with the pt at bedside. [facility] only has a semi private room available and the pt. Only wants a private room. SW provided other options for rehab that have a private room available and the pt.  Would like to go to [facility]

## 2018-03-12 NOTE — PROGRESS NOTES
[facility]    Progress Note    Worthy Lofty Patient Status:  Inpatient    12/15/1928 MRN [id number]   Location [facility] 4W/SW/SE Attending Umberto Kelly MD   Hosp Day # 15 PCP MD Yoli Allan Angie Grubbs MD  3/12/2018

## 2018-03-12 NOTE — PROGRESS NOTES
[facility] AND [facility] Infectious Disease  Progress Note    Mary Lou Israel Patient Status:  Inpatient    12/15/1928 MRN [id number]   Location [facility] 4W/SW/SE Attending Giana Blair MD   Hosp Day # 13 PCP Kamlesh Valencia MD with fluid overload  - CXR with bibasilar consolidation/effusion  - Pulmonary on consult    4.  Immunocompromised patient with a h/o RA  - On MTX and prednisone at home, MTX on hold    5.  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  Leukocytosis - multifactori

## 2018-03-12 NOTE — CM/SW NOTE
The pt. Is scheduled to discharge to [address] [place] [address] today 3/12 at 4p, via [employer]. The pt. Is aware of discharge and medicar costs. The pt's dtr. And dtr. In law are also aware.       Report [phone]    Jody Burch, [address] [facility]

## 2018-03-12 NOTE — PHYSICAL THERAPY NOTE
PHYSICAL THERAPY TREATMENT NOTE - INPATIENT     Room Number: 444/444-A       Presenting Problem:  (Sepsis secondary to UTI and blood infection )    Problem List  Principal Problem:    Sepsis due to urinary tract infection ([facility] [address]  Active Problems:    Lumbar A Little   -   Sitting down on and standing up from a chair with arms (e.g., wheelchair, bedside commode, etc.): A Little   -   Moving from lying on back to sitting on the side of the bed?: A Little   How much help from another person does the patient curr

## 2018-03-14 NOTE — DISCHARGE SUMMARY
[facility]    Discharge Summary    Dale Mendenhall Patient Status:  Inpatient    12/15/1928 MRN [id number]   Location [facility] 4W/SW/SE Attending No att. providers found   Hosp Day # 13 PCP Roger Alas MD     D antibiotics. Her mental status eventually returned to baseline and she was able to tolerate decreasing amounts of medication for pain. Patient was started with physical therapy and occupational therapy and responded slowly to this.   Patient continued to NORVASC      TAKE 1 TABLET BY MOUTH  DAILY   Quantity:  90 tablet  Refills:  1     Biotin 5000 MCG Tabs      Take 1 tablet by mouth 3 (three) times daily.    Refills:  0     Diclofenac 5% in Liposome cream      Place 2 pump onto the skin 4 (four) times teja Tabs  Commonly known as:  NORCO        lisinopril 10 MG Tabs  Commonly known as:  PRINIVIL,ZESTRIL        VESICARE 5 MG Tabs  Generic drug:  Solifenacin Succinate              Where to Get Your Medications      These medications were sent to [facility]

## 2018-04-03 PROCEDURE — 87086 URINE CULTURE/COLONY COUNT: CPT | Performed by: INTERNAL MEDICINE

## 2018-04-06 PROBLEM — N28.9 ACUTE RENAL INSUFFICIENCY: Status: RESOLVED | Noted: 2018-02-26 | Resolved: 2018-04-06

## 2018-04-06 PROBLEM — Z22.7 TB LUNG, LATENT: Status: ACTIVE | Noted: 2018-04-06

## 2018-04-30 PROBLEM — M81.0 AGE-RELATED OSTEOPOROSIS WITHOUT CURRENT PATHOLOGICAL FRACTURE: Status: ACTIVE | Noted: 2018-04-30

## 2018-04-30 PROCEDURE — 82657 ENZYME CELL ACTIVITY: CPT | Performed by: INTERNAL MEDICINE

## 2018-04-30 PROCEDURE — 82955 ASSAY OF G6PD ENZYME: CPT | Performed by: INTERNAL MEDICINE

## 2018-04-30 PROCEDURE — 36415 COLL VENOUS BLD VENIPUNCTURE: CPT | Performed by: INTERNAL MEDICINE

## 2018-07-05 PROCEDURE — 82523 COLLAGEN CROSSLINKS: CPT | Performed by: INTERNAL MEDICINE

## 2018-07-09 PROBLEM — A41.9 SEPSIS DUE TO URINARY TRACT INFECTION (HCC): Status: RESOLVED | Noted: 2018-02-26 | Resolved: 2018-07-09

## 2018-07-09 PROBLEM — N39.0 SEPSIS DUE TO URINARY TRACT INFECTION (HCC): Status: RESOLVED | Noted: 2018-02-26 | Resolved: 2018-07-09

## 2018-10-04 PROCEDURE — 82523 COLLAGEN CROSSLINKS: CPT | Performed by: INTERNAL MEDICINE

## 2018-10-10 PROBLEM — E43 SEVERE PROTEIN-CALORIE MALNUTRITION (HCC): Status: RESOLVED | Noted: 2018-03-08 | Resolved: 2018-10-10

## 2019-01-24 PROCEDURE — 86480 TB TEST CELL IMMUN MEASURE: CPT | Performed by: INTERNAL MEDICINE

## 2019-01-31 PROBLEM — N18.30 STAGE 3 CHRONIC KIDNEY DISEASE (HCC): Status: ACTIVE | Noted: 2019-01-31

## 2019-05-02 PROCEDURE — 81003 URINALYSIS AUTO W/O SCOPE: CPT | Performed by: INTERNAL MEDICINE

## 2019-05-02 PROCEDURE — 82523 COLLAGEN CROSSLINKS: CPT | Performed by: INTERNAL MEDICINE

## 2019-07-30 PROBLEM — D84.9 IMMUNODEFICIENCY (HCC): Status: ACTIVE | Noted: 2019-07-30

## 2020-03-09 PROBLEM — R39.15 URGENCY OF URINATION: Status: ACTIVE | Noted: 2020-03-09

## 2020-03-09 PROBLEM — Q61.00 RENAL CYST, CONGENITAL, LEFT: Status: ACTIVE | Noted: 2020-03-09

## 2020-07-22 PROBLEM — K57.92 DIVERTICULITIS: Status: ACTIVE | Noted: 2020-07-22

## 2021-04-10 DIAGNOSIS — Z23 NEED FOR VACCINATION: ICD-10-CM

## (undated) NOTE — IP AVS SNAPSHOT
[facility]            (For Outpatient Use Only) Initial Admit Date: 2/26/2018   Inpt/Obs Admit Date: Inpt: 2/27/18 / Obs: N/A   Discharge Date:    Shawn Early:  [de-identified]   MRN: [de-identified]   CSN: [id number]        ENCOUNTER  Patient Class Hospital Account Financial Class: Medicare Advantage    March 12, 2018

## (undated) NOTE — IP AVS SNAPSHOT
Patient Demographics     Address  [address] Phone  [phone] (Home) *Preferred*  [phone] [facility]) E-mail Address  [email or web address]      Emergency Contact(s)     Name Relation Home Work Mobile    Vicki Perez Alicia Silva DPM         folic acid 1 MG Tabs  Commonly known as:  Nia Peters  Next dose due:  3/9/18      Take 1 tablet (1 mg total) by mouth once daily.    Mario Alberto Duggan MD         gabapentin 300 MG Caps  Commonly known as:  NEURONTIN  Next dose [place] [phone], [phone]  [address]    Phone:  [phone]   Liothyronine Sodium 25 MCG Tabs     Please  your prescriptions at the location directed by your doctor or nurse    Bring a paper prescription [id number] mirtazapine (REMERON) tab 15 mg 03/11/18 2124 Given      [id number] predniSONE (DELTASONE) tab 5 mg 03/12/18 0859 Given      [id number] vancomycin (FIRST-VANCOMYCIN 50) 50 MG/ML oral solution 125 mg 03/12/18 0900 Given      [id number] vitamin B-6 ( Lab - Abbreviation Name Director Address Valid Date Range    [address] [facility] Neena Guajardo. Chadd Mcelroy M.D. [facility]. [address]  [facility] [id number] 04/29/14 1547 - Present            Microbiology Results (All)     Procedure Component Value Units Da Order Status:  Completed Lab Status:  Final result Updated:  03/04/18 1219    Specimen:  Urine from Urine, gallegos catheter      Urine Culture No Growth at 18-24 hrs.     Urine Culture, Routine Once [[id number]] Collected:  03/01/18 1144    Order Status:  Com Piperacillin + Tazobactam <=4  Sensitive    Trimethoprim/Sulfa <=20  Sensitive                        H&P - H&P Note      H&P signed by Blank Harding MD at 2/27/2018  8:56 AM  Version 1 of 1    Author:  Blank Harding MD Service:  Internal Medicine • Melanoma ([facility] [address]    • Neuralgia, neuritis, and radiculitis, unspecified 10/25/2012   • Other alopecia 1/15/2013   • OTHER DISEASES 4/30/12    URGE INCONTINENCE/UTI/SELVIN   • Personal history of skin cancer 1/15/2013   • Rheumatoid arthritis involving mu STEROID INJECTION;  Surgeon: Angel Kinney DO;  Location: [address] [place] [address]  11/29/2012: INJECTION, W/WO CONTRAST, DX/THERAPEUTIC SUBST*      Comment: Procedure: CAUDAL;  Surgeon: Jared Miguel MD;  L PAIN MANAGEMENT  1/8/2013: M-SEDAJ BY CEDRIC BACH SV 5+ YR      Comment: Procedure: CAUDAL;  Surgeon: Serge Romero MD;  Location: [address] [place] [address] MANAGEMENT  8/24/2011: OTHER SURGICAL HISTORY      Comment: spinal fus Allergies/Medications: Allergies:[MH.1] No Active Allergies    Prescriptions Prior to Admission:  ClonazePAM 0.5 MG Oral Tab 1 1/2 po q hs prn.  (Patient taking differently: Take 1.5 mg by mouth nightly as needed.  )   folic acid 1 MG Oral Tab Take 1 tabl Eyes: Negative for visual disturbance. Respiratory: Negative for cough, chest tightness and shortness of breath. Gastrointestinal: Negative for nausea, vomiting, abdominal pain and diarrhea.    Genitourinary: Negative for dysuria, urgency and hematuria AST 28 01/22/2018   ALT 47 01/22/2018   PTT 25.8 08/11/2011   INR 1.03 08/11/2011   PT 13.6 08/11/2011   TSH 1.646 10/19/2017   CRP 0.20 01/22/2018   B12 868 10/25/2017     Xr Chest Ap Portable  (cpt=71045)    Result Date: 2/26/2018  CONCLUSION:   Cristine Clinton also be a cause of her fever/infection. There is no evidence for recurrent breast cancer at this time. Continue same thyroid supplementation at this time. We will check thyroid function tests. Continue Remeron for treatment of depression.     [facility] • Ischemic colitis ([facility] 2/6/2014   • Lumbar radiculopathy 9/13/2016   • Melanoma ([facility]    • Neuralgia, neuritis, and radiculitis, unspecified 10/25/2012   • Other alopecia 1/15/2013   • OTHER DISEASES 4/30/12    URGE INCONTINENCE/UTI/SELVIN   • Personal 6/28/2016: INJECTION, ANESTHETIC/STEROID, TRANSFORAMINAL * Left      Comment: Procedure: LUMBAR / TRANSFORAMINAL EPIDURAL                STEROID INJECTION;  Surgeon: Kemal Collins DO;  Location: [address] [place] [address]  [facility]/2012[facility] Comment: Procedure: LUMBAR EPIDURAL;  Surgeon:                Terrell Heath MD;  Location: [place] [address] MANAGEMENT  1/8/2013: JOANNE-BART BY [facility] [address] [address] 5+ YR      Comment: Procedure: CAUDAL;  Surgeon: Terrell Heath, Smoking status: Never Smoker    Smokeless tobacco: Never Used    Alcohol use No    Drug use: No    Sexual activity: Not Currently     Other Topics Concern   None on file     Social History Narrative   None on file[JM.1]      Lifelong nonsmoker[JM.2] • Heparin Sodium (Porcine)  5,000 Units Subcutaneous 2 times per day     Continuous Infusing Medication:  • Dextrose-NaCl 83 mL/hr at 03/04/18 1006     PRN Medication:bisacodyl, ALPRAZolam, Normal Saline Flush, ondansetron HCl, acetaminophen     REVIEW OF Extremities:   Extremities normal, atraumatic, no cyanosis or edema   Pulses:   2+ and symmetric all extremities   Skin:   Skin color, texture, turgor normal, no rashes or lesions[JM.2]         Recent Labs   Lab  03/02/18   0510  03/03/18   0527  03/04/18 - repeat cxr tomorrow; if not clearly improving consider thoracentesis. - echo as above  3. Bronchiectasis: in RML; noted on ct from 11/2017. Prior h/o pneumonias. - ct chest now as above to r/o lung abcess  - ? MAC given RML predominance.   Could cons History of breast cancer    Low serum triiodothyronine (T3)    Severe protein-calorie malnutrition ([facility] [address]      ASSESSMENT   Pt seen daily. Balance activity performed to maximize safety with functional mobility. Pt educated on deep breathing,relaxation and e CMS Modifier (G-Code): CK    FUNCTIONAL ABILITY STATUS  Gait Assessment   Gait Assistance: Supervision  Distance (ft): 2 x 100  Assistive Device: Rolling walker  Pattern: Shuffle  Stoop/Curb Assistance: Minimum assistance  Comment : 4 stairs    Additional Recurrent major depressive disorder, in partial remission (HCC)    Acute renal insufficiency    History of breast cancer    Low serum triiodothyronine (T3)    Severe protein-calorie malnutrition ([facility] [address]      ASSESSMENT   Pt seen  Daily. Pt educated on deep PT Approx Degree of Impairment Score: 46.58%   Standardized Score (AM-PAC Scale): 43.63   CMS Modifier (G-Code): CK    FUNCTIONAL ABILITY STATUS  Gait Assessment   Gait Assistance: Minimum assistance  Distance (ft): 2 x 100  Assistive Device: Rolling walke Prolia Im Inj, Up To 60 Mg 12/01/17     Prolia Im Inj, Up To 60 Mg 05/30/17     Prolia Im Inj, Up To 60 Mg 11/29/16     Prolia Im Inj, Up To 60 Mg 05/24/16     Prolia Im Inj, Up To 60 Mg 11/20/15     Prolia Im Inj, Up To 60 Mg 05/20/15     Prolia Im Inj,